# Patient Record
Sex: FEMALE | Race: WHITE | Employment: FULL TIME | URBAN - METROPOLITAN AREA
[De-identification: names, ages, dates, MRNs, and addresses within clinical notes are randomized per-mention and may not be internally consistent; named-entity substitution may affect disease eponyms.]

---

## 2017-05-17 ENCOUNTER — ED HISTORICAL/CONVERTED ENCOUNTER (OUTPATIENT)
Dept: OTHER | Age: 42
End: 2017-05-17

## 2017-09-18 ENCOUNTER — ED HISTORICAL/CONVERTED ENCOUNTER (OUTPATIENT)
Dept: OTHER | Age: 42
End: 2017-09-18

## 2018-02-28 ENCOUNTER — ED HISTORICAL/CONVERTED ENCOUNTER (OUTPATIENT)
Dept: OTHER | Age: 43
End: 2018-02-28

## 2018-03-04 ENCOUNTER — ED HISTORICAL/CONVERTED ENCOUNTER (OUTPATIENT)
Dept: OTHER | Age: 43
End: 2018-03-04

## 2018-05-11 ENCOUNTER — ED HISTORICAL/CONVERTED ENCOUNTER (OUTPATIENT)
Dept: OTHER | Age: 43
End: 2018-05-11

## 2018-07-15 ENCOUNTER — ED HISTORICAL/CONVERTED ENCOUNTER (OUTPATIENT)
Dept: OTHER | Age: 43
End: 2018-07-15

## 2018-07-21 ENCOUNTER — ED HISTORICAL/CONVERTED ENCOUNTER (OUTPATIENT)
Dept: OTHER | Age: 43
End: 2018-07-21

## 2019-01-19 ENCOUNTER — ED HISTORICAL/CONVERTED ENCOUNTER (OUTPATIENT)
Dept: OTHER | Age: 44
End: 2019-01-19

## 2019-04-15 ENCOUNTER — ED HISTORICAL/CONVERTED ENCOUNTER (OUTPATIENT)
Dept: OTHER | Age: 44
End: 2019-04-15

## 2019-05-08 LAB — PAP SMEAR, EXTERNAL: NEGATIVE

## 2019-05-10 ENCOUNTER — ED HISTORICAL/CONVERTED ENCOUNTER (OUTPATIENT)
Dept: OTHER | Age: 44
End: 2019-05-10

## 2019-06-23 ENCOUNTER — ED HISTORICAL/CONVERTED ENCOUNTER (OUTPATIENT)
Dept: OTHER | Age: 44
End: 2019-06-23

## 2019-07-07 ENCOUNTER — ED HISTORICAL/CONVERTED ENCOUNTER (OUTPATIENT)
Dept: OTHER | Age: 44
End: 2019-07-07

## 2019-08-17 ENCOUNTER — ED HISTORICAL/CONVERTED ENCOUNTER (OUTPATIENT)
Dept: OTHER | Age: 44
End: 2019-08-17

## 2019-08-19 ENCOUNTER — ED HISTORICAL/CONVERTED ENCOUNTER (OUTPATIENT)
Dept: OTHER | Age: 44
End: 2019-08-19

## 2019-10-29 ENCOUNTER — ED HISTORICAL/CONVERTED ENCOUNTER (OUTPATIENT)
Dept: OTHER | Age: 44
End: 2019-10-29

## 2019-12-03 ENCOUNTER — ED HISTORICAL/CONVERTED ENCOUNTER (OUTPATIENT)
Dept: OTHER | Age: 44
End: 2019-12-03

## 2019-12-14 ENCOUNTER — ED HISTORICAL/CONVERTED ENCOUNTER (OUTPATIENT)
Dept: OTHER | Age: 44
End: 2019-12-14

## 2020-02-19 ENCOUNTER — ED HISTORICAL/CONVERTED ENCOUNTER (OUTPATIENT)
Dept: OTHER | Age: 45
End: 2020-02-19

## 2020-05-01 ENCOUNTER — OP HISTORICAL/CONVERTED ENCOUNTER (OUTPATIENT)
Dept: OTHER | Age: 45
End: 2020-05-01

## 2020-05-08 ENCOUNTER — OP HISTORICAL/CONVERTED ENCOUNTER (OUTPATIENT)
Dept: OTHER | Age: 45
End: 2020-05-08

## 2020-05-26 LAB
CREATININE, EXTERNAL: 0.79
LDL-C, EXTERNAL: 178

## 2020-07-10 ENCOUNTER — ED HISTORICAL/CONVERTED ENCOUNTER (OUTPATIENT)
Dept: OTHER | Age: 45
End: 2020-07-10

## 2020-07-25 ENCOUNTER — ED HISTORICAL/CONVERTED ENCOUNTER (OUTPATIENT)
Dept: OTHER | Age: 45
End: 2020-07-25

## 2020-08-11 ENCOUNTER — ED HISTORICAL/CONVERTED ENCOUNTER (OUTPATIENT)
Dept: OTHER | Age: 45
End: 2020-08-11

## 2020-08-22 VITALS
SYSTOLIC BLOOD PRESSURE: 180 MMHG | HEART RATE: 78 BPM | WEIGHT: 199.5 LBS | BODY MASS INDEX: 30.23 KG/M2 | HEIGHT: 68 IN | DIASTOLIC BLOOD PRESSURE: 108 MMHG | OXYGEN SATURATION: 98 % | TEMPERATURE: 97.8 F

## 2020-08-22 PROBLEM — E78.00 PURE HYPERCHOLESTEROLEMIA: Status: ACTIVE | Noted: 2020-05-29

## 2020-08-22 PROBLEM — R73.01 IMPAIRED FASTING GLUCOSE: Status: ACTIVE | Noted: 2020-05-29

## 2020-08-22 PROBLEM — I10 HYPERTENSION, ESSENTIAL: Status: ACTIVE | Noted: 2020-05-18

## 2020-08-22 PROBLEM — E66.9 OBESITY, UNSPECIFIED: Status: ACTIVE | Noted: 2020-05-18

## 2020-08-22 PROBLEM — F17.290 NICOTINE DEPENDENCE, OTHER TOBACCO PRODUCT, UNCOMPLICATED: Status: ACTIVE | Noted: 2020-05-18

## 2020-08-22 RX ORDER — HYDROCHLOROTHIAZIDE 12.5 MG/1
TABLET ORAL
COMMUNITY
Start: 2020-06-15 | End: 2021-08-31

## 2020-08-22 RX ORDER — ATORVASTATIN CALCIUM 10 MG/1
TABLET, FILM COATED ORAL
COMMUNITY
Start: 2020-05-29 | End: 2021-08-31

## 2020-08-22 RX ORDER — IBUPROFEN 800 MG/1
TABLET ORAL
COMMUNITY
End: 2021-08-31

## 2020-08-22 RX ORDER — ALBUTEROL SULFATE 90 UG/1
AEROSOL, METERED RESPIRATORY (INHALATION)
COMMUNITY
End: 2021-08-31

## 2020-08-22 RX ORDER — ACETAMINOPHEN 500 MG
TABLET ORAL
COMMUNITY
End: 2021-08-31

## 2020-08-22 RX ORDER — AMLODIPINE BESYLATE 5 MG/1
5 TABLET ORAL 2 TIMES DAILY
COMMUNITY
End: 2021-08-31

## 2020-08-28 DIAGNOSIS — R73.01 IMPAIRED FASTING GLUCOSE: ICD-10-CM

## 2020-08-28 DIAGNOSIS — E78.00 PURE HYPERCHOLESTEROLEMIA: Primary | ICD-10-CM

## 2020-10-20 ENCOUNTER — OFFICE VISIT (OUTPATIENT)
Dept: OBGYN CLINIC | Age: 45
End: 2020-10-20
Payer: MEDICAID

## 2020-10-20 VITALS
HEIGHT: 68 IN | DIASTOLIC BLOOD PRESSURE: 86 MMHG | WEIGHT: 205 LBS | SYSTOLIC BLOOD PRESSURE: 130 MMHG | BODY MASS INDEX: 31.07 KG/M2

## 2020-10-20 DIAGNOSIS — N94.6 DYSMENORRHEA: ICD-10-CM

## 2020-10-20 DIAGNOSIS — N92.6 IRREGULAR MENSES: Primary | ICD-10-CM

## 2020-10-20 DIAGNOSIS — N76.0 VAGINITIS AND VULVOVAGINITIS: ICD-10-CM

## 2020-10-20 PROCEDURE — 99214 OFFICE O/P EST MOD 30 MIN: CPT | Performed by: OBSTETRICS & GYNECOLOGY

## 2020-10-20 RX ORDER — NAPROXEN 500 MG/1
500 TABLET ORAL 2 TIMES DAILY WITH MEALS
Qty: 60 TAB | Refills: 1 | Status: SHIPPED | OUTPATIENT
Start: 2020-10-20 | End: 2021-08-31

## 2020-10-20 NOTE — PATIENT INSTRUCTIONS
Pelvic Pain: Care Instructions Your Care Instructions Pelvic pain, or pain in the lower belly, can have many causes. Often pelvic pain is not serious and gets better in a few days. If your pain continues or gets worse, you may need tests and treatment. Tell your doctor about any new symptoms. These may be signs of a serious problem. Follow-up care is a key part of your treatment and safety. Be sure to make and go to all appointments, and call your doctor if you are having problems. It's also a good idea to know your test results and keep a list of the medicines you take. How can you care for yourself at home? · Rest until you feel better. Lie down, and raise your legs by placing a pillow under your knees. · Drink plenty of fluids. You may find that small, frequent sips are easier on your stomach than if you drink a lot at once. Avoid drinks with carbonation or caffeine, such as soda pop, tea, or coffee. · Try eating several small meals instead of 2 or 3 large ones. Eat mild foods, such as rice, dry toast or crackers, bananas, and applesauce. Avoid fatty and spicy foods, other fruits, and alcohol until 48 hours after your symptoms have gone away. · Take an over-the-counter pain medicine, such as acetaminophen (Tylenol), ibuprofen (Advil, Motrin), or naproxen (Aleve). Read and follow all instructions on the label. · Do not take two or more pain medicines at the same time unless the doctor told you to. Many pain medicines have acetaminophen, which is Tylenol. Too much acetaminophen (Tylenol) can be harmful. · You can put a heating pad, a warm cloth, or moist heat on your belly to relieve pain. When should you call for help? Call your doctor now or seek immediate medical care if: 
  · You have a new or higher fever.  
  · You have unusual vaginal bleeding.  
  · You have new or worse belly or pelvic pain.  
  · You have vaginal discharge that has increased in amount or smells bad. Watch closely for changes in your health, and be sure to contact your doctor if: 
  · You do not get better as expected. Where can you learn more? Go to http://www.gray.com/ Enter [phone] in the search box to learn more about \"Pelvic Pain: Care Instructions. \" Current as of: November 8, 2019               Content Version: 12.6 © 2588-8849 [employer]. Care instructions adapted under license by [employer] (which disclaims liability or warranty for this information). If you have questions about a medical condition or this instruction, always ask your healthcare professional. [address] any warranty or liability for your use of this information.

## 2020-10-20 NOTE — LETTER
NOTIFICATION RETURN TO WORK / SCHOOL 
 
10/20/2020 10:58 AM 
 
Ms. Key Mccann [place] To Whom It May Concern: 
 
Key Mccann is currently under the care of [address] [place] [address]. She will return to work/school on: 10/21/2020 If there are questions or concerns please have the patient contact our office.  
 
 
 
Sincerely, 
 
 
Arnulfo Kurtz MD

## 2020-10-20 NOTE — PROGRESS NOTES
Adarsh Vásquez is a 41 y.o. female, , Patient's last menstrual period was 10/19/2020., who presents today for the following:  Chief Complaint   Patient presents with    Gyn Exam     Pt c/o pelvic pain        Allergies   Allergen Reactions    Sulfa (Sulfonamide Antibiotics) Swelling     Eyes    Amlodipine Swelling       Current Outpatient Medications   Medication Sig    naproxen (NAPROSYN) 500 mg tablet Take 1 Tab by mouth two (2) times daily (with meals).  atorvastatin (LIPITOR) 10 mg tablet TK 1 T PO QHS    hydroCHLOROthiazide (HYDRODIURIL) 12.5 mg tablet TK 1 T PO  QD    amLODIPine (NORVASC) 5 mg tablet Take 5 mg by mouth daily.  acetaminophen (TYLENOL) 500 mg tablet Take  by mouth every six (6) hours as needed for Pain.  ibuprofen (MOTRIN) 800 mg tablet Take  by mouth.  norethindrone-ethinyl estradiol (ORTHO-NOVUM 1-35 TAB, NORTREL 1-35 TAB) 1-35 mg-mcg tab Take  by mouth.  albuterol (ProAir HFA) 90 mcg/actuation inhaler Take  by inhalation. No current facility-administered medications for this visit.         Past Medical History:   Diagnosis Date    Hypertension, essential 2020    Impaired fasting glucose 2020    Nicotine dependence, other tobacco product, uncomplicated     Obesity, unspecified 2020    Pure hypercholesterolemia 2020       Past Surgical History:   Procedure Laterality Date    HX  SECTION      HX  SECTION      HX TONSILLECTOMY      HX WISDOM TEETH EXTRACTION         Family History   Problem Relation Age of Onset    Stroke Mother        Social History     Socioeconomic History    Marital status: LEGALLY      Spouse name: Not on file    Number of children: Not on file    Years of education: Not on file    Highest education level: Not on file   Occupational History    Not on file   Social Needs    Financial resource strain: Not on file    Food insecurity     Worry: Not on file     Inability: Not on file    Transportation needs     Medical: Not on file     Non-medical: Not on file   Tobacco Use    Smoking status: Current Every Day Smoker     Packs/day: 0.25    Smokeless tobacco: Never Used   Substance and Sexual Activity    Alcohol use: Not Currently    Drug use: Never    Sexual activity: Not on file   Lifestyle    Physical activity     Days per week: Not on file     Minutes per session: Not on file    Stress: Not on file   Relationships    Social connections     Talks on phone: Not on file     Gets together: Not on file     Attends Islam service: Not on file     Active member of club or organization: Not on file     Attends meetings of clubs or organizations: Not on file     Relationship status: Not on file    Intimate partner violence     Fear of current or ex partner: Not on file     Emotionally abused: Not on file     Physically abused: Not on file     Forced sexual activity: Not on file   Other Topics Concern    Not on file   Social History Narrative    Not on file         HPI   HPI  Reported by patient. Location: pelvis  Onset/Timing: > 6 months  Duration: > 6 months  Quality: aching; cramping; pressure; fullness/bloating   Severity: interferes with normal activities   Alleviating Factors:  rest  Aggravating Factors: movement  Associated Symptoms: no back pain; no chills; no constipation; no diarrhea; no vaginal discharge; no pain with urination; normal emptying of bladder; no feelings of urgency; no blood in the urine; normal libido; no fever; no nausea; no vomiting; no nocturia; heavy cycles with clots. Was scheduled for Hysteroscopy with D&C with polypectomy- awaiting cardiac clearance    Review of Systems   Constitutional: Negative. Respiratory: Negative. Cardiovascular: Negative. Gastrointestinal: Negative. Genitourinary: Negative. Musculoskeletal: Negative. Skin: Negative. Neurological: Negative. Endo/Heme/Allergies: Negative. Psychiatric/Behavioral: Negative. All other systems reviewed and are negative. /86 (BP 1 Location: Right arm)   Ht 5' 8\" (1.727 m)   Wt 205 lb (93 kg)   LMP 10/19/2020   BMI 31.17 kg/m²    OBGyn Exam   PE:  Constitutional: General Appearance: healthy-appearing, well-nourished, well-developed, and well groomed. Psychiatric: Orientation: to time, place, and person. Mood and Affect: normal mood and affect and appropriate and active and alert. Abdomen: Auscultation/Inspection/Palpation: tenderness and mass and non-distended. Hernia: none palpated. Female Genitalia: Vulva: no masses, atrophy, or lesions. Bladder/Urethra: no urethral discharge or mass and normal meatus and bladder non distended. Vagina no tenderness, erythema, cystocele, rectocele, abnormal vaginal discharge,    Cervix: no discharge or cervical motion tenderness and grossly normal.     Uterus: mobile, non-tender, and no uterine prolapse. Adnexa/Parametria: no adnexal tenderness. 1. Irregular menses      2. Dysmenorrhea    - naproxen (NAPROSYN) 500 mg tablet; Take 1 Tab by mouth two (2) times daily (with meals). Dispense: 60 Tab; Refill: 1    3.  Vaginitis and vulvovaginitis    - NUSWAB VAGINITIS PLUS (VG+) WITH CANDIDA (SIX SPECIES)

## 2020-10-22 LAB
A VAGINAE DNA VAG QL NAA+PROBE: ABNORMAL SCORE
BVAB2 DNA VAG QL NAA+PROBE: ABNORMAL SCORE
C ALBICANS DNA VAG QL NAA+PROBE: NEGATIVE
C GLABRATA DNA VAG QL NAA+PROBE: NEGATIVE
C KRUSEI DNA VAG QL NAA+PROBE: NEGATIVE
C LUSITANIAE DNA VAG QL NAA+PROBE: NEGATIVE
C TRACH DNA VAG QL NAA+PROBE: NEGATIVE
CANDIDA DNA VAG QL NAA+PROBE: NEGATIVE
MEGA1 DNA VAG QL NAA+PROBE: ABNORMAL SCORE
N GONORRHOEA DNA VAG QL NAA+PROBE: NEGATIVE
T VAGINALIS DNA VAG QL NAA+PROBE: NEGATIVE

## 2020-11-03 ENCOUNTER — HOSPITAL ENCOUNTER (OUTPATIENT)
Dept: PREADMISSION TESTING | Age: 45
Discharge: HOME | End: 2020-11-03
Payer: MEDICAID

## 2020-11-03 VITALS
RESPIRATION RATE: 16 BRPM | OXYGEN SATURATION: 99 % | TEMPERATURE: 98.4 F | HEIGHT: 68 IN | BODY MASS INDEX: 31.67 KG/M2 | SYSTOLIC BLOOD PRESSURE: 153 MMHG | DIASTOLIC BLOOD PRESSURE: 93 MMHG | WEIGHT: 209 LBS

## 2020-11-03 LAB
ALBUMIN SERPL-MCNC: 3.7 G/DL (ref 3.5–5)
ALBUMIN/GLOB SERPL: 1 {RATIO} (ref 1.1–2.2)
ALP SERPL-CCNC: 74 U/L (ref 45–117)
ALT SERPL-CCNC: 22 U/L (ref 12–78)
ANION GAP SERPL CALC-SCNC: 5 MMOL/L (ref 5–15)
AST SERPL W P-5'-P-CCNC: 16 U/L (ref 15–37)
BILIRUB SERPL-MCNC: 0.2 MG/DL (ref 0.2–1)
BUN SERPL-MCNC: 13 MG/DL (ref 6–20)
BUN/CREAT SERPL: 15 (ref 12–20)
CA-I BLD-MCNC: 9.1 MG/DL (ref 8.5–10.1)
CHLORIDE SERPL-SCNC: 107 MMOL/L (ref 97–108)
CO2 SERPL-SCNC: 28 MMOL/L (ref 21–32)
CREAT SERPL-MCNC: 0.85 MG/DL (ref 0.55–1.02)
ERYTHROCYTE [DISTWIDTH] IN BLOOD BY AUTOMATED COUNT: 13.6 % (ref 11.5–14.5)
GLOBULIN SER CALC-MCNC: 3.6 G/DL (ref 2–4)
GLUCOSE SERPL-MCNC: 98 MG/DL (ref 65–100)
HCT VFR BLD AUTO: 41.8 % (ref 35–47)
HGB BLD-MCNC: 14 G/DL (ref 11.5–16)
MCH RBC QN AUTO: 29 PG (ref 26–34)
MCHC RBC AUTO-ENTMCNC: 33.5 G/DL (ref 30–36.5)
MCV RBC AUTO: 86.7 FL (ref 80–99)
PLATELET # BLD AUTO: 297 K/UL (ref 150–400)
PMV BLD AUTO: 10.8 FL (ref 8.9–12.9)
POTASSIUM SERPL-SCNC: 3.7 MMOL/L (ref 3.5–5.1)
PROT SERPL-MCNC: 7.3 G/DL (ref 6.4–8.2)
RBC # BLD AUTO: 4.82 M/UL (ref 3.8–5.2)
SODIUM SERPL-SCNC: 140 MMOL/L (ref 136–145)
WBC # BLD AUTO: 8.5 K/UL (ref 3.6–11)

## 2020-11-03 PROCEDURE — 86900 BLOOD TYPING SEROLOGIC ABO: CPT

## 2020-11-03 PROCEDURE — 36415 COLL VENOUS BLD VENIPUNCTURE: CPT

## 2020-11-03 PROCEDURE — 80053 COMPREHEN METABOLIC PANEL: CPT

## 2020-11-03 PROCEDURE — 85027 COMPLETE CBC AUTOMATED: CPT

## 2020-11-03 NOTE — PERIOP NOTES
Patient stated during PAT visit that she went to cardiologist and received cardiac clearance.  [address] [facility], spoke to Pieter Ercikson, requested clearance and office notes to be faxed to PAT dept

## 2020-11-04 LAB
ABO + RH BLD: NORMAL
BLOOD BANK CMNT PATIENT-IMP: NORMAL
BLOOD GROUP ANTIBODIES SERPL: NEGATIVE
SPECIMEN EXP DATE BLD: NORMAL

## 2020-11-07 ENCOUNTER — HOSPITAL ENCOUNTER (OUTPATIENT)
Dept: PREADMISSION TESTING | Age: 45
Discharge: HOME | End: 2020-11-07
Payer: MEDICAID

## 2020-11-07 LAB — SARS-COV-2, COV2: NORMAL

## 2020-11-07 PROCEDURE — 87635 SARS-COV-2 COVID-19 AMP PRB: CPT

## 2020-11-10 LAB — SARS-COV-2, COV2NT: NOT DETECTED

## 2020-11-11 ENCOUNTER — HOSPITAL ENCOUNTER (OUTPATIENT)
Age: 45
Discharge: HOME | End: 2020-11-11
Attending: OBSTETRICS & GYNECOLOGY | Admitting: OBSTETRICS & GYNECOLOGY
Payer: MEDICAID

## 2020-11-11 ENCOUNTER — ANESTHESIA EVENT (OUTPATIENT)
Dept: SURGERY | Age: 45
End: 2020-11-11
Payer: MEDICAID

## 2020-11-11 ENCOUNTER — ANESTHESIA (OUTPATIENT)
Dept: SURGERY | Age: 45
End: 2020-11-11
Payer: MEDICAID

## 2020-11-11 VITALS
HEART RATE: 70 BPM | SYSTOLIC BLOOD PRESSURE: 140 MMHG | DIASTOLIC BLOOD PRESSURE: 78 MMHG | WEIGHT: 207 LBS | BODY MASS INDEX: 31.37 KG/M2 | OXYGEN SATURATION: 98 % | RESPIRATION RATE: 18 BRPM | TEMPERATURE: 97.3 F | HEIGHT: 68 IN

## 2020-11-11 DIAGNOSIS — G89.18 POST-OP PAIN: Primary | ICD-10-CM

## 2020-11-11 PROBLEM — N92.6 IRREGULAR MENSES: Status: ACTIVE | Noted: 2020-11-11

## 2020-11-11 LAB
HCG SERPL QL: NEGATIVE
POTASSIUM SERPL-SCNC: 3.2 MMOL/L (ref 3.5–5.1)

## 2020-11-11 PROCEDURE — 76210000006 HC OR PH I REC 0.5 TO 1 HR: Performed by: OBSTETRICS & GYNECOLOGY

## 2020-11-11 PROCEDURE — 74011250636 HC RX REV CODE- 250/636: Performed by: NURSE ANESTHETIST, CERTIFIED REGISTERED

## 2020-11-11 PROCEDURE — 76210000026 HC REC RM PH II 1 TO 1.5 HR: Performed by: OBSTETRICS & GYNECOLOGY

## 2020-11-11 PROCEDURE — 2709999900 HC NON-CHARGEABLE SUPPLY: Performed by: OBSTETRICS & GYNECOLOGY

## 2020-11-11 PROCEDURE — 36415 COLL VENOUS BLD VENIPUNCTURE: CPT

## 2020-11-11 PROCEDURE — 84703 CHORIONIC GONADOTROPIN ASSAY: CPT

## 2020-11-11 PROCEDURE — 74011250637 HC RX REV CODE- 250/637: Performed by: OBSTETRICS & GYNECOLOGY

## 2020-11-11 PROCEDURE — 76060000032 HC ANESTHESIA 0.5 TO 1 HR: Performed by: OBSTETRICS & GYNECOLOGY

## 2020-11-11 PROCEDURE — 74011250636 HC RX REV CODE- 250/636: Performed by: OBSTETRICS & GYNECOLOGY

## 2020-11-11 PROCEDURE — 58558 HYSTEROSCOPY BIOPSY: CPT | Performed by: OBSTETRICS & GYNECOLOGY

## 2020-11-11 PROCEDURE — 77030033136 HC TBNG INFLO AQUILEX ST HOLO -C: Performed by: OBSTETRICS & GYNECOLOGY

## 2020-11-11 PROCEDURE — 77030033137 HC TBNG OUTFLO AQUILEX ST HOLO -B: Performed by: OBSTETRICS & GYNECOLOGY

## 2020-11-11 PROCEDURE — 84132 ASSAY OF SERUM POTASSIUM: CPT

## 2020-11-11 PROCEDURE — 77030037417 HC DEV TISS RMVL HOLO -H: Performed by: OBSTETRICS & GYNECOLOGY

## 2020-11-11 PROCEDURE — 74011250636 HC RX REV CODE- 250/636: Performed by: ANESTHESIOLOGY

## 2020-11-11 PROCEDURE — 74011000250 HC RX REV CODE- 250: Performed by: NURSE ANESTHETIST, CERTIFIED REGISTERED

## 2020-11-11 PROCEDURE — 76010000138 HC OR TIME 0.5 TO 1 HR: Performed by: OBSTETRICS & GYNECOLOGY

## 2020-11-11 PROCEDURE — 88305 TISSUE EXAM BY PATHOLOGIST: CPT

## 2020-11-11 RX ORDER — METOPROLOL TARTRATE 5 MG/5ML
2.5 INJECTION INTRAVENOUS
Status: DISCONTINUED | OUTPATIENT
Start: 2020-11-11 | End: 2020-11-12 | Stop reason: HOSPADM

## 2020-11-11 RX ORDER — HYDROMORPHONE HYDROCHLORIDE 1 MG/ML
0.5 INJECTION, SOLUTION INTRAMUSCULAR; INTRAVENOUS; SUBCUTANEOUS
Status: DISCONTINUED | OUTPATIENT
Start: 2020-11-11 | End: 2020-11-12 | Stop reason: HOSPADM

## 2020-11-11 RX ORDER — DEXAMETHASONE SODIUM PHOSPHATE 4 MG/ML
INJECTION, SOLUTION INTRA-ARTICULAR; INTRALESIONAL; INTRAMUSCULAR; INTRAVENOUS; SOFT TISSUE AS NEEDED
Status: DISCONTINUED | OUTPATIENT
Start: 2020-11-11 | End: 2020-11-11 | Stop reason: HOSPADM

## 2020-11-11 RX ORDER — SODIUM CHLORIDE, SODIUM LACTATE, POTASSIUM CHLORIDE, CALCIUM CHLORIDE 600; 310; 30; 20 MG/100ML; MG/100ML; MG/100ML; MG/100ML
20 INJECTION, SOLUTION INTRAVENOUS CONTINUOUS
Status: DISCONTINUED | OUTPATIENT
Start: 2020-11-11 | End: 2020-11-11 | Stop reason: HOSPADM

## 2020-11-11 RX ORDER — FENTANYL CITRATE 50 UG/ML
INJECTION, SOLUTION INTRAMUSCULAR; INTRAVENOUS AS NEEDED
Status: DISCONTINUED | OUTPATIENT
Start: 2020-11-11 | End: 2020-11-11 | Stop reason: HOSPADM

## 2020-11-11 RX ORDER — OXYCODONE AND ACETAMINOPHEN 5; 325 MG/1; MG/1
1 TABLET ORAL ONCE
Status: COMPLETED | OUTPATIENT
Start: 2020-11-11 | End: 2020-11-11

## 2020-11-11 RX ORDER — FENTANYL CITRATE 50 UG/ML
50 INJECTION, SOLUTION INTRAMUSCULAR; INTRAVENOUS
Status: DISCONTINUED | OUTPATIENT
Start: 2020-11-11 | End: 2020-11-12 | Stop reason: HOSPADM

## 2020-11-11 RX ORDER — MIDAZOLAM HYDROCHLORIDE 1 MG/ML
INJECTION, SOLUTION INTRAMUSCULAR; INTRAVENOUS AS NEEDED
Status: DISCONTINUED | OUTPATIENT
Start: 2020-11-11 | End: 2020-11-11 | Stop reason: HOSPADM

## 2020-11-11 RX ORDER — LIDOCAINE HYDROCHLORIDE 20 MG/ML
INJECTION, SOLUTION EPIDURAL; INFILTRATION; INTRACAUDAL; PERINEURAL AS NEEDED
Status: DISCONTINUED | OUTPATIENT
Start: 2020-11-11 | End: 2020-11-11 | Stop reason: HOSPADM

## 2020-11-11 RX ORDER — SODIUM CHLORIDE 0.9 % (FLUSH) 0.9 %
5-40 SYRINGE (ML) INJECTION EVERY 8 HOURS
Status: DISCONTINUED | OUTPATIENT
Start: 2020-11-11 | End: 2020-11-12 | Stop reason: HOSPADM

## 2020-11-11 RX ORDER — SODIUM CHLORIDE 0.9 % (FLUSH) 0.9 %
5-40 SYRINGE (ML) INJECTION EVERY 8 HOURS
Status: DISCONTINUED | OUTPATIENT
Start: 2020-11-11 | End: 2020-11-11 | Stop reason: HOSPADM

## 2020-11-11 RX ORDER — PROPOFOL 10 MG/ML
INJECTION, EMULSION INTRAVENOUS AS NEEDED
Status: DISCONTINUED | OUTPATIENT
Start: 2020-11-11 | End: 2020-11-11 | Stop reason: HOSPADM

## 2020-11-11 RX ORDER — ONDANSETRON 2 MG/ML
4 INJECTION INTRAMUSCULAR; INTRAVENOUS AS NEEDED
Status: DISCONTINUED | OUTPATIENT
Start: 2020-11-11 | End: 2020-11-12 | Stop reason: HOSPADM

## 2020-11-11 RX ORDER — ONDANSETRON 2 MG/ML
INJECTION INTRAMUSCULAR; INTRAVENOUS AS NEEDED
Status: DISCONTINUED | OUTPATIENT
Start: 2020-11-11 | End: 2020-11-11 | Stop reason: HOSPADM

## 2020-11-11 RX ORDER — IBUPROFEN 800 MG/1
800 TABLET ORAL
Qty: 30 TAB | Refills: 0 | Status: SHIPPED | OUTPATIENT
Start: 2020-11-11 | End: 2021-08-31

## 2020-11-11 RX ORDER — OXYCODONE AND ACETAMINOPHEN 5; 325 MG/1; MG/1
1 TABLET ORAL
Qty: 10 TAB | Refills: 0 | Status: SHIPPED | OUTPATIENT
Start: 2020-11-11 | End: 2020-11-14

## 2020-11-11 RX ORDER — KETOROLAC TROMETHAMINE 30 MG/ML
INJECTION, SOLUTION INTRAMUSCULAR; INTRAVENOUS AS NEEDED
Status: DISCONTINUED | OUTPATIENT
Start: 2020-11-11 | End: 2020-11-11 | Stop reason: HOSPADM

## 2020-11-11 RX ORDER — LABETALOL HCL 20 MG/4 ML
10 SYRINGE (ML) INTRAVENOUS
Status: DISCONTINUED | OUTPATIENT
Start: 2020-11-11 | End: 2020-11-12 | Stop reason: HOSPADM

## 2020-11-11 RX ORDER — SODIUM CHLORIDE 0.9 % (FLUSH) 0.9 %
5-40 SYRINGE (ML) INJECTION AS NEEDED
Status: DISCONTINUED | OUTPATIENT
Start: 2020-11-11 | End: 2020-11-12 | Stop reason: HOSPADM

## 2020-11-11 RX ORDER — SODIUM CHLORIDE 9 MG/ML
1000 INJECTION, SOLUTION INTRAVENOUS CONTINUOUS
Status: DISCONTINUED | OUTPATIENT
Start: 2020-11-11 | End: 2020-11-12 | Stop reason: HOSPADM

## 2020-11-11 RX ADMIN — PROPOFOL 150 MG: 10 INJECTION, EMULSION INTRAVENOUS at 09:46

## 2020-11-11 RX ADMIN — FENTANYL CITRATE 50 MCG: 50 INJECTION, SOLUTION INTRAMUSCULAR; INTRAVENOUS at 11:12

## 2020-11-11 RX ADMIN — OXYCODONE HYDROCHLORIDE AND ACETAMINOPHEN 1 TABLET: 5; 325 TABLET ORAL at 12:18

## 2020-11-11 RX ADMIN — DEXAMETHASONE SODIUM PHOSPHATE 4 MG: 4 INJECTION, SOLUTION INTRA-ARTICULAR; INTRALESIONAL; INTRAMUSCULAR; INTRAVENOUS; SOFT TISSUE at 09:57

## 2020-11-11 RX ADMIN — HYDROMORPHONE HYDROCHLORIDE 0.5 MG: 1 INJECTION, SOLUTION INTRAMUSCULAR; INTRAVENOUS; SUBCUTANEOUS at 11:02

## 2020-11-11 RX ADMIN — FENTANYL CITRATE 25 MCG: 50 INJECTION, SOLUTION INTRAMUSCULAR; INTRAVENOUS at 09:51

## 2020-11-11 RX ADMIN — LIDOCAINE HYDROCHLORIDE 100 MG: 20 INJECTION, SOLUTION EPIDURAL; INFILTRATION; INTRACAUDAL; PERINEURAL at 09:46

## 2020-11-11 RX ADMIN — ONDANSETRON 4 MG: 2 INJECTION INTRAMUSCULAR; INTRAVENOUS at 10:29

## 2020-11-11 RX ADMIN — KETOROLAC TROMETHAMINE 30 MG: 30 INJECTION, SOLUTION INTRAMUSCULAR at 10:30

## 2020-11-11 RX ADMIN — SODIUM CHLORIDE, POTASSIUM CHLORIDE, SODIUM LACTATE AND CALCIUM CHLORIDE 20 ML/HR: 600; 310; 30; 20 INJECTION, SOLUTION INTRAVENOUS at 08:50

## 2020-11-11 RX ADMIN — MIDAZOLAM HYDROCHLORIDE 2 MG: 2 INJECTION, SOLUTION INTRAMUSCULAR; INTRAVENOUS at 09:38

## 2020-11-11 RX ADMIN — HYDROMORPHONE HYDROCHLORIDE 0.5 MG: 1 INJECTION, SOLUTION INTRAMUSCULAR; INTRAVENOUS; SUBCUTANEOUS at 10:48

## 2020-11-11 RX ADMIN — FENTANYL CITRATE 25 MCG: 50 INJECTION, SOLUTION INTRAMUSCULAR; INTRAVENOUS at 09:56

## 2020-11-11 RX ADMIN — FENTANYL CITRATE 50 MCG: 50 INJECTION, SOLUTION INTRAMUSCULAR; INTRAVENOUS at 10:12

## 2020-11-11 NOTE — OP NOTES
Port Operative Report    Date of Surgery: 11/11/2020     Preoperative Diagnosis: Menorrhagia with irregular cycle [N92.1]  Intermenstrual pain [N94.0]    Postoperative Diagnosis: Menorrhagia with irregular cycle [N92.1]  Intermenstrual pain [N94.0]     Procedure:  Procedure(s):  Hysteroscopy Dilation & Curettage w/Myosure     Surgeon: Brigitte Escobar MD    Assistants: Surgeon(s):  Billy Jiménez MD    Anesthesia:  General     Procedure in Detail:  Nichole Jones was taken to the operating room and the patient was taken to the operating room where she was placed in the supine position. After undergoing adequate general endotracheal anesthesia, she was placed up in the  laparoscopy stirrups in the dorsal lithotomy position. The patient was then prepped and draped in the usual fashion. The Bladder was drained of 200cc of urine. Attention was first turned to the vagina where the speculum was placed in the vagina. The anterior lip of the cervix was grasped with a single-toothed tenaculum. The cervix was dilated up to admit a  hysteroscope using normal saline for distention media. The above findings were noted. Myosure introduced and curetting obtained. Scope removed. Using a curet, a gentle curettage was performed and specimen was sent to pathology  Findings:  Thickened appeared endometrium    Estimated Blood Loss:   minimal    Specimens: geovanni

## 2020-11-11 NOTE — DISCHARGE INSTRUCTIONS
Patient Education        Operative Hysteroscopy: What to Expect at Home  Your Recovery     An operative hysteroscopy is a procedure to find and treat problems with your uterus. It may have been done to remove growths from the uterus. Or it may have been done to treat fertility problems or abnormal bleeding. You may have cramps and vaginal bleeding for several days. If the doctor filled your uterus with air during the procedure, you may have gas pains, a feeling of fullness in your belly, or shoulder pain. These symptoms usually go away in 1 or 2 days. If the doctor filled your uterus with liquid during the procedure, you may have watery vaginal discharge for a few days. Many women are able to return to work on the day after the procedure. But it depends on what was done during the procedure and the type of work you do. This care sheet gives you a general idea about how long it will take for you to recover. But each person recovers at a different pace. Follow the steps below to get better as quickly as possible. How can you care for yourself at home? Activity    · Rest when you feel tired. Getting enough sleep will help you recover.     · Most women are able to return to work on the day after the procedure.     · You may shower and take baths as usual.     · Ask your doctor when it is okay for you to have sex.     · Talk about birth control with your doctor. Do not try to become pregnant until your doctor says it is okay. Diet    · You can eat your normal diet. If your stomach is upset, try bland, low-fat foods like plain rice, broiled chicken, toast, and yogurt.     · You may notice that your bowel movements are not regular right after the procedure. This is common. Try to avoid constipation and straining with bowel movements. You may want to take a fiber supplement every day. If you have not had a bowel movement after a couple of days, ask your doctor about taking a mild laxative.    Medicines    · Your doctor will tell you if and when you can restart your medicines. He or she will also give you instructions about taking any new medicines.     · If you take aspirin or some other blood thinner, ask your doctor if and when to start taking it again. Make sure that you understand exactly what your doctor wants you to do.     · Be safe with medicines. Take pain medicines exactly as directed. ? If the doctor gave you a prescription medicine for pain, take it as prescribed. ? If you are not taking a prescription pain medicine, ask your doctor if you can take an over-the-counter medicine. ? Do not take two or more pain medicines at the same time unless the doctor told you to. Many pain medicines have acetaminophen, which is Tylenol. Too much acetaminophen (Tylenol) can be harmful.     · If you think your pain medicine is making you sick to your stomach:  ? Take your medicine after meals (unless your doctor has told you not to). ? Ask your doctor for a different pain medicine.     · If your doctor prescribed antibiotics, take them as directed. Do not stop taking them just because you feel better. You need to take the full course of antibiotics. Other instructions    · You may have some light vaginal bleeding. Wear sanitary pads if needed. Do not douche or use tampons until your doctor says it is okay.     · You may want to use a heating pad on your belly to help with pain. Use a low heat setting. Follow-up care is a key part of your treatment and safety. Be sure to make and go to all appointments, and call your doctor if you are having problems. It's also a good idea to know your test results and keep a list of the medicines you take. When should you call for help? Call 911 anytime you think you may need emergency care. For example, call if:    · You pass out (lose consciousness).     · You have chest pain, are short of breath, or cough up blood.    Call your doctor now or seek immediate medical care if:    · You have bright red vaginal bleeding that soaks one or more pads in an hour, or you have large clots.     · You have vaginal discharge that has increased in amount or smells bad.     · You are sick to your stomach or cannot drink fluids.     · You have pain that does not get better after you take pain medicine.     · You have signs of infection, such as:  ? Increased pain, swelling, warmth, or redness. ? A fever.     · You cannot pass stools or gas.     · You have signs of a blood clot in your leg (called a deep vein thrombosis), such as:  ? Pain in your calf, back of the knee, thigh, or groin. ? Redness and swelling in your leg. Watch closely for changes in your health, and be sure to contact your doctor if you have any problems. Where can you learn more? Go to http://www.gray.com/  Enter R673 in the search box to learn more about \"Operative Hysteroscopy: What to Expect at Home. \"  Current as of: November 8, 2019               Content Version: 12.6  © 7828-3117 [employer]. Care instructions adapted under license by [employer] (which disclaims liability or warranty for this information). If you have questions about a medical condition or this instruction, always ask your healthcare professional. [address] any warranty or liability for your use of this information.

## 2020-11-11 NOTE — ROUTINE PROCESS
Pt. Transferred to [facility] via stretcher in stable condition. Bedside report given. SBAR reviewed.

## 2020-11-11 NOTE — ANESTHESIA POSTPROCEDURE EVALUATION
Procedure(s):  Hysteroscopy Dilation & Curettage w/Myosure.     general    Anesthesia Post Evaluation      Multimodal analgesia: multimodal analgesia used between 6 hours prior to anesthesia start to PACU discharge  Patient location during evaluation: PACU  Patient participation: complete - patient participated  Level of consciousness: awake  Pain score: 0  Pain management: adequate  Airway patency: patent  Anesthetic complications: no  Cardiovascular status: acceptable  Respiratory status: acceptable  Hydration status: acceptable  Post anesthesia nausea and vomiting:  controlled  Final Post Anesthesia Temperature Assessment:  Normothermia (36.0-37.5 degrees C)      INITIAL Post-op Vital signs:   Vitals Value Taken Time   /82 11/11/2020 11:15 AM   Temp 36.3 °C (97.4 °F) 11/11/2020 11:15 AM   Pulse 78 11/11/2020 11:15 AM   Resp 17 11/11/2020 11:15 AM   SpO2 97 % 11/11/2020 11:15 AM

## 2020-11-11 NOTE — ANESTHESIA PREPROCEDURE EVALUATION
Relevant Problems   No relevant active problems       Anesthetic History   No history of anesthetic complications            Review of Systems / Medical History  Patient summary reviewed, nursing notes reviewed and pertinent labs reviewed    Pulmonary  Within defined limits                 Neuro/Psych   Within defined limits           Cardiovascular    Hypertension          Hyperlipidemia         GI/Hepatic/Renal  Within defined limits              Endo/Other        Obesity     Other Findings   Comments: Menorrhagia           Physical Exam    Airway  Mallampati: II  TM Distance: 4 - 6 cm  Neck ROM: normal range of motion   Mouth opening: Normal     Cardiovascular    Rhythm: regular  Rate: normal         Dental    Dentition: Poor dentition     Pulmonary  Breath sounds clear to auscultation               Abdominal  GI exam deferred       Other Findings            Anesthetic Plan    ASA: 2  Anesthesia type: general          Induction: Intravenous  Anesthetic plan and risks discussed with: Patient

## 2020-11-12 ENCOUNTER — NURSE TRIAGE (OUTPATIENT)
Dept: OBGYN CLINIC | Age: 45
End: 2020-11-12

## 2020-11-12 DIAGNOSIS — R11.0 NAUSEA: Primary | ICD-10-CM

## 2020-11-12 RX ORDER — METOCLOPRAMIDE 10 MG/1
10 TABLET ORAL
Qty: 20 TAB | Refills: 0 | Status: SHIPPED | OUTPATIENT
Start: 2020-11-12 | End: 2020-11-17

## 2020-11-12 NOTE — TELEPHONE ENCOUNTER
Patient left a message on triage voicemail that she had surgery yesterday and is experiencing nausea. Returned patient's call and advised her per Dr Abbey Saldaña, a prescription for Reglan has been sent to her pharmacy.

## 2021-01-29 ENCOUNTER — OFFICE VISIT (OUTPATIENT)
Dept: PRIMARY CARE CLINIC | Age: 46
End: 2021-01-29
Payer: MEDICAID

## 2021-01-29 VITALS
SYSTOLIC BLOOD PRESSURE: 150 MMHG | OXYGEN SATURATION: 97 % | DIASTOLIC BLOOD PRESSURE: 90 MMHG | HEART RATE: 77 BPM | TEMPERATURE: 98.4 F

## 2021-01-29 DIAGNOSIS — Z13.6 SCREENING FOR CARDIOVASCULAR, RESPIRATORY, AND GENITOURINARY DISEASES: ICD-10-CM

## 2021-01-29 DIAGNOSIS — R05.9 COUGH: ICD-10-CM

## 2021-01-29 DIAGNOSIS — F17.210 TOBACCO DEPENDENCE DUE TO CIGARETTES: ICD-10-CM

## 2021-01-29 DIAGNOSIS — R43.2 LOSS OF TASTE: ICD-10-CM

## 2021-01-29 DIAGNOSIS — Z13.83 SCREENING FOR CARDIOVASCULAR, RESPIRATORY, AND GENITOURINARY DISEASES: ICD-10-CM

## 2021-01-29 DIAGNOSIS — Z13.89 SCREENING FOR CARDIOVASCULAR, RESPIRATORY, AND GENITOURINARY DISEASES: ICD-10-CM

## 2021-01-29 DIAGNOSIS — Z20.822 SUSPECTED 2019 NOVEL CORONAVIRUS INFECTION: Primary | ICD-10-CM

## 2021-01-29 DIAGNOSIS — R11.0 NAUSEA: ICD-10-CM

## 2021-01-29 PROCEDURE — 99213 OFFICE O/P EST LOW 20 MIN: CPT | Performed by: NURSE PRACTITIONER

## 2021-01-29 NOTE — PROGRESS NOTES
Pat King is a 41 y.o. female who was seen in clinic today (1/29/2021) for an acute visit. Assessment/Plan:            * COVID-19 sample collected and submitted       * Patient given detailed CDC instructions contained within After Visit Summary    Diagnoses and all orders for this visit:    1. Suspected 2019 novel coronavirus infection    2. Screening for cardiovascular, respiratory, and genitourinary diseases  -     NOVEL CORONAVIRUS (COVID-19)    3. Cough  -     NOVEL CORONAVIRUS (COVID-19)    4. Nausea  -     NOVEL CORONAVIRUS (COVID-19)    5. Loss of taste  -     NOVEL CORONAVIRUS (COVID-19)    6. Tobacco dependence due to cigarettes       known covid exposure. Discussed expected course/resolution/complications of diagnosis in detail with patient. Advised pt on CDC guidance, OTC medications for symptom management, red flags reviewed and should develop to seek emergency medical attn. Reviewed with her that COVID-19 pandemic is an evolving situation with rapidly changing recommendations & guidelines, continue to practice hand hygiene, social distancing, wearing of facial coverings. Regardless of testing results, should still follow CDC guidelines as there is a chance of a false negative, such as a poor sample collection or being too soon to test after exposure. Medical decisions are made based on the the best information available at the time. Recommended she stay tuned for updates published by trusted sources and to advise your PCP of any unexpected changes in clinical condition     Subjective:   Shima was seen today for Chills (Patient has c/o symptoms x 3 days, no known COVID exposure), Cough, Generalized Body Aches, Headache, Sore Throat, Nausea, Vomiting, and Diarrhea   Has been taking OTC cold medicine with relief. She denies a recent history/current: loss of smell/taste, cough, fever, wheezing, SOB, and RUELAS. Smokes 3 cigarettes a day down from a pack, isn't interested in any more cessation. Travel Screening     Question   Response    In the last month, have you been in contact with someone who was confirmed or suspected to have Coronavirus / COVID-19? No / Unsure    Have you had a COVID-19 viral test in the last 14 days? Do you have any of the following new or worsening symptoms? Cough; Chills; Sore throat; Loss of taste; Loss of smell;Diarrhea;Vomiting    Have you traveled internationally in the last month? No      Travel History   Travel since 12/29/20     No documented travel since 12/29/20          ROS - Pertinent items are noted in HPI    Patient Active Problem List   Diagnosis Code    Hypertension, essential I10    Pure hypercholesterolemia E78.00    Impaired fasting glucose R73.01    Obesity, unspecified E66.9    Nicotine dependence, other tobacco product, uncomplicated A72.564    Irregular menses N92.6     Home Medications    Medication Sig Start Date End Date Taking? Authorizing Provider   ibuprofen (MOTRIN) 800 mg tablet Take 1 Tab by mouth every eight (8) hours as needed for Pain. 11/11/20   Duane Laughlin MD   naproxen (NAPROSYN) 500 mg tablet Take 1 Tab by mouth two (2) times daily (with meals). Patient taking differently: Take 500 mg by mouth two (2) times daily (with meals). As needed 10/20/20   Duane Laughlin MD   atorvastatin (LIPITOR) 10 mg tablet TK 1 T PO QHS 5/29/20   Provider, Historical   hydroCHLOROthiazide (HYDRODIURIL) 12.5 mg tablet TK 1 T PO  QD 6/15/20   Provider, Historical   amLODIPine (NORVASC) 5 mg tablet Take 5 mg by mouth two (2) times a day. Provider, Historical   acetaminophen (TYLENOL) 500 mg tablet Take  by mouth every six (6) hours as needed for Pain. Provider, Historical   ibuprofen (MOTRIN) 800 mg tablet Take  by mouth. Provider, Historical   norethindrone-ethinyl estradiol (ORTHO-NOVUM 1-35 TAB, NORTREL 1-35 TAB) 1-35 mg-mcg tab Take  by mouth.     Provider, Historical   albuterol (ProAir HFA) 90 mcg/actuation inhaler Take  by inhalation.    Provider, Historical      Allergies   Allergen Reactions   • Sulfa (Sulfonamide Antibiotics) Swelling     Eyes   • Amlodipine Swelling          Objective:   Physical Exam  General:  alert, cooperative, no distress   Ears: Normal external ear canals AU   Sinuses: Normal paranasal sinuses without tenderness   Neck: supple, symmetrical, trachea midline.   Heart: normal rate, regular rhythm   Lungs: No dyspneic or audible respiratory distress.        Visit Vitals  BP (!) 150/90 (BP 1 Location: Right upper arm, BP Patient Position: Sitting)   Pulse 77   Temp 98.4 °F (36.9 °C)   SpO2 97%         Disclaimer:        Medication risks/benefits/costs/interactions/alternatives discussed with patient.  She was given an after visit summary which includes diagnoses, current medications, & vitals.  She expressed understanding with the diagnosis and plan.      Aspects of this note may have been generated using voice recognition software. Despite editing, there may be some syntax errors.       Yuri Gallego NP

## 2021-01-30 LAB — SARS-COV-2, NAA: NOT DETECTED

## 2021-02-01 ENCOUNTER — PATIENT MESSAGE (OUTPATIENT)
Dept: PRIMARY CARE CLINIC | Age: 46
End: 2021-02-01

## 2021-08-31 ENCOUNTER — HOSPITAL ENCOUNTER (EMERGENCY)
Age: 46
Discharge: LEFT BEFORE DISCHARGE | End: 2021-08-31
Attending: EMERGENCY MEDICINE
Payer: MEDICAID

## 2021-08-31 VITALS
BODY MASS INDEX: 31.83 KG/M2 | TEMPERATURE: 98.4 F | WEIGHT: 210 LBS | HEART RATE: 88 BPM | OXYGEN SATURATION: 99 % | DIASTOLIC BLOOD PRESSURE: 105 MMHG | SYSTOLIC BLOOD PRESSURE: 203 MMHG | HEIGHT: 68 IN | RESPIRATION RATE: 18 BRPM

## 2021-08-31 PROCEDURE — 75810000275 HC EMERGENCY DEPT VISIT NO LEVEL OF CARE

## 2021-08-31 NOTE — ED TRIAGE NOTES
\" I have had a sinus infection for the last 2 days, I tried a neti pot and all but its not working so I know its an infection \"

## 2021-12-29 ENCOUNTER — APPOINTMENT (OUTPATIENT)
Dept: GENERAL RADIOLOGY | Age: 46
End: 2021-12-29
Attending: EMERGENCY MEDICINE
Payer: MEDICAID

## 2021-12-29 ENCOUNTER — HOSPITAL ENCOUNTER (EMERGENCY)
Age: 46
Discharge: HOME | End: 2021-12-29
Attending: EMERGENCY MEDICINE
Payer: MEDICAID

## 2021-12-29 VITALS
DIASTOLIC BLOOD PRESSURE: 99 MMHG | WEIGHT: 215 LBS | SYSTOLIC BLOOD PRESSURE: 153 MMHG | OXYGEN SATURATION: 97 % | TEMPERATURE: 98.7 F | HEIGHT: 68 IN | RESPIRATION RATE: 18 BRPM | HEART RATE: 99 BPM | BODY MASS INDEX: 32.58 KG/M2

## 2021-12-29 DIAGNOSIS — J20.9 ACUTE BRONCHITIS, UNSPECIFIED ORGANISM: ICD-10-CM

## 2021-12-29 DIAGNOSIS — J06.9 ACUTE UPPER RESPIRATORY INFECTION: Primary | ICD-10-CM

## 2021-12-29 PROCEDURE — 71045 X-RAY EXAM CHEST 1 VIEW: CPT

## 2021-12-29 PROCEDURE — 99283 EMERGENCY DEPT VISIT LOW MDM: CPT

## 2021-12-29 PROCEDURE — 74011636637 HC RX REV CODE- 636/637: Performed by: EMERGENCY MEDICINE

## 2021-12-29 PROCEDURE — 74011250637 HC RX REV CODE- 250/637: Performed by: EMERGENCY MEDICINE

## 2021-12-29 RX ORDER — AZITHROMYCIN 250 MG/1
500 TABLET, FILM COATED ORAL
Status: COMPLETED | OUTPATIENT
Start: 2021-12-29 | End: 2021-12-29

## 2021-12-29 RX ORDER — PREDNISONE 20 MG/1
60 TABLET ORAL ONCE
Status: COMPLETED | OUTPATIENT
Start: 2021-12-29 | End: 2021-12-29

## 2021-12-29 RX ORDER — PREDNISONE 20 MG/1
TABLET ORAL
Qty: 12 TABLET | Refills: 0 | Status: SHIPPED | OUTPATIENT
Start: 2021-12-29 | End: 2022-07-16

## 2021-12-29 RX ORDER — ALBUTEROL SULFATE 90 UG/1
1 AEROSOL, METERED RESPIRATORY (INHALATION)
Qty: 6.7 G | Refills: 1 | Status: SHIPPED | OUTPATIENT
Start: 2021-12-29

## 2021-12-29 RX ORDER — AZITHROMYCIN 250 MG/1
TABLET, FILM COATED ORAL
Qty: 6 TABLET | Refills: 0 | Status: SHIPPED | OUTPATIENT
Start: 2021-12-29 | End: 2022-07-16

## 2021-12-29 RX ADMIN — PREDNISONE 60 MG: 20 TABLET ORAL at 21:54

## 2021-12-29 RX ADMIN — AZITHROMYCIN MONOHYDRATE 500 MG: 250 TABLET ORAL at 21:54

## 2021-12-29 NOTE — Clinical Note
[address] [facility] EMERGENCY DEPARTMENT  [address] [place] [address]  [phone]    Work/School Note    Date: 12/29/2021     To Whom It May concern:    Swengel Show was evaluated by the following provider(s):  Attending Provider: Viky Sewell MD.   COVID19 virus is suspected. Per the CDC guidelines we recommend home isolation until the following conditions are all met:    1. At least 10 days have passed since symptoms first appeared and  2. At least 24 hours have passed since last fever without the use of fever-reducing medications and  3.  Symptoms (e.g., cough, shortness of breath) have improved      Sincerely,          India Brady MD

## 2021-12-30 NOTE — ED TRIAGE NOTES
Pt states that she has been having cough, fatigue and shortness of breath since Christmas Eve. Pt states her co-worker just tested positive for COVID today.

## 2021-12-30 NOTE — ED PROVIDER NOTES
EMERGENCY DEPARTMENT HISTORY AND PHYSICAL EXAM      Date: 2021  Patient Name: Gianni Moore    History of Presenting Illness     Chief Complaint   Patient presents with    Cough    Nasal Congestion    Shortness of Breath       History Provided By: Patient    HPI: Gianni Moore, 40 y.o. female   presents to the ED with cc of cough. Patient complains of nasal congestion along with a productive cough for last 3 days. Patient also complained intermittent episode of shortness of breath during the coughing episode. Patient states that she has had a pulse with active vomiting yesterday. Patient received 2 Covid vaccination without booster. Patient was recently exposed to COVID-19 at her work. No fever chills. No generalized body ache or headache. No diarrhea. PCP: Mary Pearce MD    No current facility-administered medications on file prior to encounter. No current outpatient medications on file prior to encounter. Past History     Past Medical History:  Past Medical History:   Diagnosis Date    Hypertension, essential 2020    Impaired fasting glucose 2020    Nicotine dependence, other tobacco product, uncomplicated     Obesity, unspecified 2020    Pure hypercholesterolemia 2020    pt states not on medication anymore       Past Surgical History:  Past Surgical History:   Procedure Laterality Date    HX  SECTION      HX  SECTION      HX TONSILLECTOMY      HX WISDOM TEETH EXTRACTION         Family History:  Family History   Problem Relation Age of Onset    Stroke Mother     Cancer Father        Social History:  Social History     Tobacco Use    Smoking status: Current Every Day Smoker     Packs/day: 0.25     Years: 20.00     Pack years: 5.00    Smokeless tobacco: Never Used    Tobacco comment: states 4 a day   Substance Use Topics    Alcohol use: Not Currently    Drug use: Never       Allergies:   Allergies   Allergen Reactions    Sulfa (Sulfonamide Antibiotics) Swelling     Eyes    Amlodipine Swelling         Review of Systems   Review of Systems   Constitutional: Negative for activity change, appetite change, chills and fever. HENT: Positive for rhinorrhea. Negative for sore throat. Eyes: Negative for discharge. Respiratory: Positive for cough. Negative for shortness of breath. Cardiovascular: Negative for chest pain. Gastrointestinal: Negative for nausea. Endocrine: Negative for polyuria. Genitourinary: Negative for difficulty urinating. Musculoskeletal: Negative for arthralgias. Skin: Negative for rash. Neurological: Negative for headaches. Hematological: Negative for adenopathy. Psychiatric/Behavioral: Negative for suicidal ideas. All other systems reviewed and are negative. Physical Exam   Physical Exam  Vitals and nursing note reviewed. Constitutional:       General: She is not in acute distress. Appearance: Normal appearance. She is not ill-appearing, toxic-appearing or diaphoretic. HENT:      Head: Normocephalic and atraumatic. Nose: Nose normal.      Mouth/Throat:      Mouth: Mucous membranes are moist.      Pharynx: Oropharynx is clear. Eyes:      Conjunctiva/sclera: Conjunctivae normal.   Cardiovascular:      Rate and Rhythm: Normal rate and regular rhythm. Heart sounds: Normal heart sounds. Pulmonary:      Effort: Pulmonary effort is normal.      Breath sounds: Rhonchi present. Abdominal:      General: Abdomen is flat. Bowel sounds are normal.      Palpations: Abdomen is soft. Musculoskeletal:      Cervical back: Neck supple. Right lower leg: No edema. Left lower leg: No edema. Skin:     General: Skin is warm and dry. Neurological:      General: No focal deficit present. Mental Status: She is alert and oriented to person, place, and time.    Psychiatric:         Mood and Affect: Mood normal.         Diagnostic Study Results     Labs -   No results found for this or any previous visit (from the past 12 hour(s)). Radiologic Studies -   XR CHEST SNGL V   Final Result   No acute cardiopulmonary process. CT Results  (Last 48 hours)    None        CXR Results  (Last 48 hours)               12/29/21 2006  XR CHEST SNGL V Final result    Impression:  No acute cardiopulmonary process. Narrative:  Exam: XR CHEST SNGL V         Indication:  cough; Comparison: Chest radiograph from July 7, 2019       Findings: The cardiomediastinal silhouette is within normal limits. No focal parenchymal   process. No pleural effusion. No pneumothorax. No acute osseous abnormality. Medical Decision Making   I am the first provider for this patient. I reviewed the vital signs, available nursing notes, past medical history, past surgical history, family history and social history. Vital Signs-Reviewed the patient's vital signs. Patient Vitals for the past 12 hrs:   Temp Pulse Resp BP SpO2   12/29/21 2123  99 18 (!) 153/99    12/29/21 1941 98.7 °F (37.1 °C) (!) 104 16 (!) 177/100 97 %       Records Reviewed:     Provider Notes (Medical Decision Making):       ED Course:   Initial assessment performed. The patients presenting problems have been discussed, and they are in agreement with the care plan formulated and outlined with them. I have encouraged them to ask questions as they arise throughout their visit. PROCEDURES      Disposition: Condition stable   DC- Adult Discharges: All of the diagnostic tests were reviewed and questions answered. Diagnosis, care plan and treatment options were discussed. understand instructions and will follow up as directed. The patients results have been reviewed with them. They have been counseled regarding their diagnosis.   The patient verbally convey understanding and agreement of the signs, symptoms, diagnosis, treatment and prognosis and additionally agrees to follow up as recommended. They also agree with the care-plan and convey that all of their questions have been answered. I have also put together some discharge instructions for them that include: 1) educational information regarding their diagnosis, 2) how to care for their diagnosis at home, as well a 3) list of reasons why they would want to return to the ED prior to their follow-up appointment, should their condition change. PLAN:  1. Current Discharge Medication List      START taking these medications    Details   albuterol (PROVENTIL HFA, VENTOLIN HFA, PROAIR HFA) 90 mcg/actuation inhaler Take 1 Puff by inhalation every four (4) hours as needed for Wheezing. Qty: 6.7 g, Refills: 1  Start date: 12/29/2021      predniSONE (DELTASONE) 20 mg tablet 3 tablets for 2 days then 2 tablets for 2 days then 1 tablet for 2 day  Qty: 12 Tablet, Refills: 0  Start date: 12/29/2021      azithromycin (Zithromax Z-Glenn) 250 mg tablet As instructed in the pack  Qty: 6 Tablet, Refills: 0  Start date: 12/29/2021           2. Follow-up Information     Follow up With Specialties Details Why Contact Info    Follow up with your primary care physician  Schedule an appointment as soon as possible for a visit in 3 days As needed         Return to ED if worse     Diagnosis     Clinical Impression:   1. Acute upper respiratory infection    2. Acute bronchitis, unspecified organism        Please note that this dictation was completed with [employer], the computer voice recognition software. Quite often unanticipated grammatical, syntax, homophones, and other interpretive errors are inadvertently transcribed by the computer software. Please disregard these errors. Please excuse any errors that have escaped final proofreading. Thank you.

## 2022-03-18 PROBLEM — R73.01 IMPAIRED FASTING GLUCOSE: Status: ACTIVE | Noted: 2020-05-29

## 2022-03-18 PROBLEM — E78.00 PURE HYPERCHOLESTEROLEMIA: Status: ACTIVE | Noted: 2020-05-29

## 2022-03-18 PROBLEM — I10 HYPERTENSION, ESSENTIAL: Status: ACTIVE | Noted: 2020-05-18

## 2022-03-19 PROBLEM — F17.290 NICOTINE DEPENDENCE, OTHER TOBACCO PRODUCT, UNCOMPLICATED: Status: ACTIVE | Noted: 2020-05-18

## 2022-03-19 PROBLEM — E66.9 OBESITY, UNSPECIFIED: Status: ACTIVE | Noted: 2020-05-18

## 2022-03-20 PROBLEM — N92.6 IRREGULAR MENSES: Status: ACTIVE | Noted: 2020-11-11

## 2022-07-16 ENCOUNTER — HOSPITAL ENCOUNTER (EMERGENCY)
Age: 47
Discharge: HOME | End: 2022-07-16
Attending: EMERGENCY MEDICINE
Payer: MEDICAID

## 2022-07-16 VITALS
BODY MASS INDEX: 30.01 KG/M2 | SYSTOLIC BLOOD PRESSURE: 170 MMHG | DIASTOLIC BLOOD PRESSURE: 96 MMHG | WEIGHT: 198 LBS | RESPIRATION RATE: 18 BRPM | OXYGEN SATURATION: 96 % | HEART RATE: 86 BPM | HEIGHT: 68 IN | TEMPERATURE: 98.2 F

## 2022-07-16 DIAGNOSIS — S05.01XA ABRASION OF RIGHT CORNEA, INITIAL ENCOUNTER: Primary | ICD-10-CM

## 2022-07-16 PROCEDURE — 74011000250 HC RX REV CODE- 250: Performed by: EMERGENCY MEDICINE

## 2022-07-16 PROCEDURE — 99283 EMERGENCY DEPT VISIT LOW MDM: CPT

## 2022-07-16 RX ORDER — SPIRONOLACTONE 25 MG/1
TABLET ORAL
COMMUNITY
Start: 2022-04-24

## 2022-07-16 RX ORDER — TETRACAINE HYDROCHLORIDE 5 MG/ML
1 SOLUTION OPHTHALMIC
Status: COMPLETED | OUTPATIENT
Start: 2022-07-16 | End: 2022-07-16

## 2022-07-16 RX ORDER — NAPROXEN 500 MG/1
500 TABLET ORAL 2 TIMES DAILY WITH MEALS
Qty: 20 TABLET | Refills: 0 | Status: SHIPPED | OUTPATIENT
Start: 2022-07-16 | End: 2022-07-26

## 2022-07-16 RX ORDER — ERYTHROMYCIN 5 MG/G
OINTMENT OPHTHALMIC
Qty: 3.5 G | Refills: 0 | Status: SHIPPED | OUTPATIENT
Start: 2022-07-16 | End: 2022-07-23

## 2022-07-16 RX ADMIN — TETRACAINE HYDROCHLORIDE 1 DROP: 5 SOLUTION OPHTHALMIC at 10:04

## 2022-07-16 RX ADMIN — FLUORESCEIN SODIUM 1 STRIP: 1 STRIP OPHTHALMIC at 10:04

## 2022-07-16 NOTE — ED PROVIDER NOTES
EMERGENCY DEPARTMENT HISTORY AND PHYSICAL EXAM      Date: 2022  Patient Name: Olga Lidia Phan    History of Presenting Illness     Chief Complaint   Patient presents with    Eye Pain       History Provided By: Patient    HPI: Olga Lidia Phan, 56 y.o. female with a past medical history significant No significant past medical history presents to the ED with cc of right eye pain. Patient was doing yard work this morning when something got into her eye. Since this time she has had eye pain and her eye keeps watering. She is otherwise been in her normal state of good health. There are no other complaints, changes, or physical findings at this time. PCP: Go Soni MD    No current facility-administered medications on file prior to encounter. Current Outpatient Medications on File Prior to Encounter   Medication Sig Dispense Refill    spironolactone (ALDACTONE) 25 mg tablet TAKE 1 TABLET BY MOUTH DAILY FOR BLOOD PRESSURE      albuterol (PROVENTIL HFA, VENTOLIN HFA, PROAIR HFA) 90 mcg/actuation inhaler Take 1 Puff by inhalation every four (4) hours as needed for Wheezing.  6.7 g 1    [DISCONTINUED] predniSONE (DELTASONE) 20 mg tablet 3 tablets for 2 days then 2 tablets for 2 days then 1 tablet for 2 day 12 Tablet 0    [DISCONTINUED] azithromycin (Zithromax Z-Glenn) 250 mg tablet As instructed in the pack 6 Tablet 0       Past History     Past Medical History:  Past Medical History:   Diagnosis Date    Hypertension, essential 2020    Impaired fasting glucose 2020    Nicotine dependence, other tobacco product, uncomplicated     Obesity, unspecified 2020    Pure hypercholesterolemia 2020    pt states not on medication anymore       Past Surgical History:  Past Surgical History:   Procedure Laterality Date    HX  SECTION      HX  SECTION      HX TONSILLECTOMY      HX WISDOM TEETH EXTRACTION         Family History:  Family History   Problem Relation Age of Onset    Stroke Mother     Cancer Father        Social History:  Social History     Tobacco Use    Smoking status: Current Every Day Smoker     Packs/day: 0.25     Years: 20.00     Pack years: 5.00    Smokeless tobacco: Never Used    Tobacco comment: states 4 a day   Substance Use Topics    Alcohol use: Not Currently    Drug use: Never       Allergies: Allergies   Allergen Reactions    Sulfa (Sulfonamide Antibiotics) Swelling     Eyes    Amlodipine Swelling         Review of Systems     Review of Systems   Constitutional: Negative for activity change and fever. Eyes: Positive for pain. Respiratory: Negative for cough. Gastrointestinal: Negative for abdominal pain, diarrhea, nausea and vomiting. Musculoskeletal: Negative for arthralgias and myalgias. Skin: Negative for rash and wound. Neurological: Negative for syncope and headaches. Psychiatric/Behavioral: Negative for confusion. All other systems reviewed and are negative. Physical Exam     Physical Exam  Vitals and nursing note reviewed. Constitutional:       Appearance: Normal appearance. She is normal weight. HENT:      Head: Normocephalic and atraumatic. Nose: Nose normal.      Mouth/Throat:      Mouth: Mucous membranes are moist.   Eyes:      Extraocular Movements: Extraocular movements intact. Pupils: Pupils are equal, round, and reactive to light. Comments: Left eye is normal.  Right with conjunctival injection, watery. Cardiovascular:      Rate and Rhythm: Normal rate. Pulses: Normal pulses. Pulmonary:      Effort: Pulmonary effort is normal. No respiratory distress. Musculoskeletal:         General: No swelling or deformity. Normal range of motion. Skin:     General: Skin is warm and dry. Findings: No rash. Neurological:      General: No focal deficit present. Mental Status: She is alert.    Psychiatric:         Mood and Affect: Mood normal.         Behavior: Behavior normal.         Lab and Diagnostic Study Results     Labs -   No results found for this or any previous visit (from the past 12 hour(s)). Radiologic Studies -   @lastxrresult@  CT Results  (Last 48 hours)    None        CXR Results  (Last 48 hours)    None            Medical Decision Making   - I am the first provider for this patient. - I reviewed the vital signs, available nursing notes, past medical history, past surgical history, family history and social history. - Initial assessment performed. The patients presenting problems have been discussed, and they are in agreement with the care plan formulated and outlined with them. I have encouraged them to ask questions as they arise throughout their visit. Vital Signs-Reviewed the patient's vital signs. Patient Vitals for the past 12 hrs:   Temp Pulse Resp BP SpO2   07/16/22 0957 -- -- -- (!) 170/96 --   07/16/22 0955 98.2 °F (36.8 °C) 86 18 -- 96 %       Records Reviewed: Nursing Notes        ED Course:     ED Course as of 07/16/22 1332   Sat Jul 16, 2022   84 45-year-old female presents for cute onset of right eye pain. Patient was doing yard work and probably something got in her eye. It is now very painful. She does not wear contacts. Eye is injected and watery. Normal eye pressures. Does have a corneal abrasion at 12:00 on fluorescein exam.  No foreign bodies appreciated. Did berenice eyelid. We will flushed with 1 L of normal saline and Ej lens. Patient has significant pain improved with tetracaine. Will prescribe antibiotic ointment and recommend NSAIDs for pain control. Outpatient follow-up with ophthalmology. [LW]   1030 Visual acuity is as follows. Both eyes 20/14, left eye 20/14, right eye 20/30. [LW]      ED Course User Index  [LW] Sue Molina MD     .edproc    Disposition   Disposition: DC- Adult Discharges: All of the diagnostic tests were reviewed and questions answered.  Diagnosis, care plan and treatment options were discussed. The patient understands the instructions and will follow up as directed. The patients results have been reviewed with them. They have been counseled regarding their diagnosis. The patient verbally convey understanding and agreement of the signs, symptoms, diagnosis, treatment and prognosis and additionally agrees to follow up as recommended with their PCP in 24 - 48 hours. They also agree with the care-plan and convey that all of their questions have been answered. I have also put together some discharge instructions for them that include: 1) educational information regarding their diagnosis, 2) how to care for their diagnosis at home, as well a 3) list of reasons why they would want to return to the ED prior to their follow-up appointment, should their condition change. Discharged    DISCHARGE PLAN:  1. Current Discharge Medication List      CONTINUE these medications which have NOT CHANGED    Details   spironolactone (ALDACTONE) 25 mg tablet TAKE 1 TABLET BY MOUTH DAILY FOR BLOOD PRESSURE      albuterol (PROVENTIL HFA, VENTOLIN HFA, PROAIR HFA) 90 mcg/actuation inhaler Take 1 Puff by inhalation every four (4) hours as needed for Wheezing. Qty: 6.7 g, Refills: 1           2. Follow-up Information     Follow up With Specialties Details Why [address]  Schedule an appointment as soon as possible for a visit in 2 days  [address] [address]    [facility]  As needed [address] [place] [id number] [phone]        3. Return to ED if worse   4. Discharge Medication List as of 7/16/2022 11:45 AM      START taking these medications    Details   erythromycin (ILOTYCIN) ophthalmic ointment Use 1 inch ribbon 4 time daily, Normal, Disp-3.5 g, R-0      naproxen (Naprosyn) 500 mg tablet Take 1 Tablet by mouth two (2) times daily (with meals) for 10 days. , Normal, Disp-20 Tablet, R-0         CONTINUE these medications which have NOT CHANGED    Details   spironolactone (ALDACTONE) 25 mg tablet TAKE 1 TABLET BY MOUTH DAILY FOR BLOOD PRESSURE, Historical Med      albuterol (PROVENTIL HFA, VENTOLIN HFA, PROAIR HFA) 90 mcg/actuation inhaler Take 1 Puff by inhalation every four (4) hours as needed for Wheezing., Normal, Disp-6.7 g, R-1               Diagnosis     Clinical Impression:   1. Abrasion of right cornea, initial encounter        Attestations:    Radha Dent MD    Please note that this dictation was completed with [employer], the [employer] voice recognition software. Quite often unanticipated grammatical, syntax, homophones, and other interpretive errors are inadvertently transcribed by the computer software. Please disregard these errors. Please excuse any errors that have escaped final proofreading. Thank you.

## 2022-07-16 NOTE — Clinical Note
[address]  [address] [facility] [address]  [phone]    Work/School Note    Date: 7/16/2022    To Whom It May concern:    Fred Diggs was seen and treated today in the emergency room by the following provider(s):  Attending Provider: Tim Hsieh MD.      Fred Diggs is excused from work/school on 07/16/22 and 07/17/22. She is medically clear to return to work/school on 7/18/2022.        Sincerely,          Mitesh Carter MD

## 2022-08-23 ENCOUNTER — HOSPITAL ENCOUNTER (EMERGENCY)
Age: 47
Discharge: HOME | End: 2022-08-23
Attending: EMERGENCY MEDICINE
Payer: MEDICAID

## 2022-08-23 VITALS
SYSTOLIC BLOOD PRESSURE: 174 MMHG | OXYGEN SATURATION: 98 % | RESPIRATION RATE: 16 BRPM | DIASTOLIC BLOOD PRESSURE: 93 MMHG | HEIGHT: 68 IN | BODY MASS INDEX: 29.86 KG/M2 | TEMPERATURE: 98 F | HEART RATE: 86 BPM | WEIGHT: 197 LBS

## 2022-08-23 DIAGNOSIS — L23.7 POISON IVY DERMATITIS: Primary | ICD-10-CM

## 2022-08-23 PROCEDURE — 99284 EMERGENCY DEPT VISIT MOD MDM: CPT

## 2022-08-23 PROCEDURE — 96372 THER/PROPH/DIAG INJ SC/IM: CPT

## 2022-08-23 PROCEDURE — 74011250636 HC RX REV CODE- 250/636: Performed by: EMERGENCY MEDICINE

## 2022-08-23 RX ORDER — PREDNISONE 20 MG/1
TABLET ORAL
Qty: 12 TABLET | Refills: 0 | Status: SHIPPED | OUTPATIENT
Start: 2022-08-23

## 2022-08-23 RX ORDER — DEXAMETHASONE SODIUM PHOSPHATE 10 MG/ML
10 INJECTION INTRAMUSCULAR; INTRAVENOUS ONCE
Status: COMPLETED | OUTPATIENT
Start: 2022-08-23 | End: 2022-08-23

## 2022-08-23 RX ADMIN — DEXAMETHASONE SODIUM PHOSPHATE 10 MG: 10 INJECTION, SOLUTION INTRAMUSCULAR; INTRAVENOUS at 22:46

## 2022-08-24 NOTE — ED PROVIDER NOTES
EMERGENCY DEPARTMENT HISTORY AND PHYSICAL EXAM      Date: 2022  Patient Name: Topher Love    History of Presenting Illness     Chief Complaint   Patient presents with    Rash       History Provided By: Patient    HPI: Topher Love, 56 y.o. female   presents to the ED with cc of rash. Patient complains of itchy rash on her left arm and back after a contact with poison ivy doing her yard work yesterday. No signs angioedema. No joint pain. No fever chills. No OTC treatment. PCP: Lady Milagro MD    No current facility-administered medications on file prior to encounter. Current Outpatient Medications on File Prior to Encounter   Medication Sig Dispense Refill    spironolactone (ALDACTONE) 25 mg tablet TAKE 1 TABLET BY MOUTH DAILY FOR BLOOD PRESSURE      albuterol (PROVENTIL HFA, VENTOLIN HFA, PROAIR HFA) 90 mcg/actuation inhaler Take 1 Puff by inhalation every four (4) hours as needed for Wheezing. 6.7 g 1       Past History     Past Medical History:  Past Medical History:   Diagnosis Date    Hypertension, essential 2020    Impaired fasting glucose 2020    Nicotine dependence, other tobacco product, uncomplicated 6286    Obesity, unspecified 2020    Pure hypercholesterolemia 2020    pt states not on medication anymore       Past Surgical History:  Past Surgical History:   Procedure Laterality Date    HX  SECTION      HX  SECTION      HX TONSILLECTOMY      HX WISDOM TEETH EXTRACTION         Family History:  Family History   Problem Relation Age of Onset    Stroke Mother     Cancer Father        Social History:  Social History     Tobacco Use    Smoking status: Every Day     Packs/day: 0.25     Years: 20.00     Pack years: 5.00     Types: Cigarettes    Smokeless tobacco: Never    Tobacco comments:     states 4 a day   Substance Use Topics    Alcohol use: Not Currently    Drug use: Never       Allergies:   Allergies   Allergen Reactions    Sulfa (Sulfonamide Antibiotics) Swelling     Eyes    Amlodipine Swelling         Review of Systems   Review of Systems   Constitutional:  Negative for chills and fever. HENT:  Negative for sore throat. Eyes:  Negative for discharge. Respiratory:  Negative for shortness of breath. Cardiovascular:  Negative for chest pain. Gastrointestinal:  Negative for abdominal pain. Skin:  Positive for rash. Neurological:  Negative for headaches. Physical Exam   Physical Exam  Vitals and nursing note reviewed. Constitutional:       General: She is not in acute distress. Appearance: Normal appearance. She is well-developed. She is not ill-appearing or toxic-appearing. HENT:      Head: Normocephalic and atraumatic. Nose: No congestion. Mouth/Throat:      Mouth: Mucous membranes are moist.   Eyes:      Conjunctiva/sclera: Conjunctivae normal.   Cardiovascular:      Rate and Rhythm: Regular rhythm. Heart sounds: Normal heart sounds. Pulmonary:      Effort: Pulmonary effort is normal.      Breath sounds: Normal breath sounds. Abdominal:      General: Bowel sounds are normal.      Palpations: Abdomen is soft. Tenderness: There is no abdominal tenderness. Musculoskeletal:         General: No deformity. Cervical back: Neck supple. Skin:     General: Skin is warm and dry. Comments: A patch of weeping maculopapular lesions on left forearm    Neurological:      General: No focal deficit present. Mental Status: She is alert. Psychiatric:         Mood and Affect: Mood normal.       Diagnostic Study Results     Labs -   No results found for this or any previous visit (from the past 12 hour(s)). Radiologic Studies -   No orders to display     CT Results  (Last 48 hours)      None          CXR Results  (Last 48 hours)      None              Medical Decision Making   I am the first provider for this patient.     I reviewed the vital signs, available nursing notes, past medical history, past surgical history, family history and social history. Vital Signs-Reviewed the patient's vital signs. Patient Vitals for the past 12 hrs:   Temp Pulse Resp BP SpO2   08/23/22 2221 98 °F (36.7 °C) 86 16 (!) 174/93 98 %       Records Reviewed:     Provider Notes (Medical Decision Making):       ED Course:   Initial assessment performed. The patients presenting problems have been discussed, and they are in agreement with the care plan formulated and outlined with them. I have encouraged them to ask questions as they arise throughout their visit. PROCEDURES      Disposition: Condition stable   DC- Adult Discharges: All of the diagnostic tests were reviewed and questions answered. Diagnosis, care plan and treatment options were discussed. understand instructions and will follow up as directed. The patients results have been reviewed with them. They have been counseled regarding their diagnosis. The patient verbally convey understanding and agreement of the signs, symptoms, diagnosis, treatment and prognosis and additionally agrees to follow up as recommended. They also agree with the care-plan and convey that all of their questions have been answered. I have also put together some discharge instructions for them that include: 1) educational information regarding their diagnosis, 2) how to care for their diagnosis at home, as well a 3) list of reasons why they would want to return to the ED prior to their follow-up appointment, should their condition change. PLAN:  1. Current Discharge Medication List        START taking these medications    Details   predniSONE (DELTASONE) 20 mg tablet 3 tablets for 2 days then 2 tablets for 2 days then 1 tablet for 2 day  Qty: 12 Tablet, Refills: 0  Start date: 8/23/2022           2.    Follow-up Information       Follow up With Specialties Details Why Contact Info    Follow up with your primary care physician  Schedule an appointment as soon as possible for a visit in 3 days As needed           Return to ED if worse     Diagnosis     Clinical Impression:   1. Poison ivy dermatitis        Please note that this dictation was completed with [employer], the computer voice recognition software. Quite often unanticipated grammatical, syntax, homophones, and other interpretive errors are inadvertently transcribed by the computer software. Please disregard these errors. Please excuse any errors that have escaped final proofreading. Thank you.

## 2023-01-05 ENCOUNTER — HOSPITAL ENCOUNTER (EMERGENCY)
Age: 48
Discharge: HOME | End: 2023-01-05
Attending: EMERGENCY MEDICINE
Payer: MEDICAID

## 2023-01-05 VITALS
TEMPERATURE: 97.5 F | BODY MASS INDEX: 29.86 KG/M2 | DIASTOLIC BLOOD PRESSURE: 112 MMHG | OXYGEN SATURATION: 98 % | SYSTOLIC BLOOD PRESSURE: 160 MMHG | RESPIRATION RATE: 17 BRPM | WEIGHT: 197 LBS | HEIGHT: 68 IN | HEART RATE: 77 BPM

## 2023-01-05 DIAGNOSIS — K06.8 PAIN IN GUMS: Primary | ICD-10-CM

## 2023-01-05 PROCEDURE — 99282 EMERGENCY DEPT VISIT SF MDM: CPT

## 2023-01-05 NOTE — Clinical Note
[address]  [address] [place] [id number]  [phone]    Work/School Note    Date: 1/5/2023    To Whom It May concern:    Gerardo Perales was seen and treated today in the emergency room by the following provider(s):  Attending Provider: Radha Rendon MD.      Gerardo Perales is excused from work/school on 01/05/23 and 01/06/23. She is medically clear to return to work/school on 1/7/2023.        Sincerely,          Nathanael Cruz MD

## 2023-01-06 NOTE — ED TRIAGE NOTES
Pt reports concern about her left lower molar extraction possibly having fragments left over preventing her from closing her mouth completely.

## 2023-01-08 ENCOUNTER — HOSPITAL ENCOUNTER (EMERGENCY)
Age: 48
Discharge: HOME | End: 2023-01-08
Attending: FAMILY MEDICINE
Payer: MEDICAID

## 2023-01-08 VITALS
WEIGHT: 210 LBS | TEMPERATURE: 98.9 F | RESPIRATION RATE: 18 BRPM | OXYGEN SATURATION: 100 % | DIASTOLIC BLOOD PRESSURE: 96 MMHG | BODY MASS INDEX: 31.83 KG/M2 | HEART RATE: 76 BPM | HEIGHT: 68 IN | SYSTOLIC BLOOD PRESSURE: 168 MMHG

## 2023-01-08 DIAGNOSIS — R22.0 SWELLING OF GUMS: Primary | ICD-10-CM

## 2023-01-08 PROCEDURE — 99283 EMERGENCY DEPT VISIT LOW MDM: CPT

## 2023-01-08 PROCEDURE — 74011250637 HC RX REV CODE- 250/637: Performed by: FAMILY MEDICINE

## 2023-01-08 RX ORDER — CLINDAMYCIN HYDROCHLORIDE 150 MG/1
300 CAPSULE ORAL ONCE
Status: COMPLETED | OUTPATIENT
Start: 2023-01-08 | End: 2023-01-08

## 2023-01-08 RX ORDER — CHLORHEXIDINE GLUCONATE 1.2 MG/ML
RINSE ORAL
COMMUNITY
Start: 2023-01-04

## 2023-01-08 RX ORDER — CLINDAMYCIN HYDROCHLORIDE 300 MG/1
300 CAPSULE ORAL 4 TIMES DAILY
Qty: 28 CAPSULE | Refills: 0 | Status: SHIPPED | OUTPATIENT
Start: 2023-01-08 | End: 2023-01-15

## 2023-01-08 RX ORDER — BENZOIN
TINCTURE TOPICAL
COMMUNITY
Start: 2023-01-04

## 2023-01-08 RX ORDER — IBUPROFEN 600 MG/1
TABLET ORAL
COMMUNITY
Start: 2023-01-04

## 2023-01-08 RX ORDER — OXYCODONE HYDROCHLORIDE 5 MG/1
5 TABLET ORAL
COMMUNITY
Start: 2023-01-04

## 2023-01-08 RX ADMIN — CLINDAMYCIN HYDROCHLORIDE 300 MG: 150 CAPSULE ORAL at 17:06

## 2023-01-08 NOTE — Clinical Note
[address]  [address] [facility] [address]  [phone]    Work/School Note    Date: 1/8/2023    To Whom It May concern:    Terris Kussmaul was seen and treated today in the emergency room by the following provider(s):  Attending Provider: Mabel Felty, DO. Terris Kussmaul is excused from work/school on 1/8/2023 through 1/10/2023. She is medically clear to return to work/school on 1/11/2023.          Sincerely,          Krissy Cloud DO

## 2023-01-08 NOTE — ED TRIAGE NOTES
Pt had all her teeth pulled Wednesday in readiness for implants. Sucked on a milkshake straw without thinking. Both sided of gums are swollen.

## 2023-01-12 NOTE — ED PROVIDER NOTES
EMERGENCY DEPARTMENT HISTORY AND PHYSICAL EXAM      Date: 2023  Patient Name: Alice Mccracken    History of Presenting Illness     Chief Complaint   Patient presents with    Gum Problem       History Provided By:     HPI: Alice Mccracken, is a very pleasant 56 y.o. female presenting to the ED with a chief complaint of gum problem. Patient states she recently had all of her teeth extracted in preparation for implants. Since this time she has noticed her gums are slightly swollen. They are tender to the touch. This occurred after using a straw on accident. No swelling under tongue. No difficulty swallowing. No neck pain. No fevers chills rigors. The patient denies any other symptoms at this time. PCP: Jim Carrasco MD    No current facility-administered medications on file prior to encounter. Current Outpatient Medications on File Prior to Encounter   Medication Sig Dispense Refill    chlorhexidine (PERIDEX) 0.12 % solution GENTLY SWISH AND SPIT 15 ML BY MOUTH TWICE DAILY AS DIRECTED      oxyCODONE IR (ROXICODONE) 5 mg immediate release tablet Take 5 mg by mouth every six (6) hours as needed. ibuprofen (MOTRIN) 600 mg tablet TAKE 1 TABLET BY MOUTH EVERY 6 HOURS AS DIRECTED      Pain Reliever ES,acetaminophn, 500 mg tablet TAKE 1 CAPLET BY MOUTH EVERY 6 TO 8 HOURS.  DO NOT TAKE IN COMBINATION WITH OTHER MEDICATIONS INCULDING ACTEAMINOPHEN      spironolactone (ALDACTONE) 25 mg tablet TAKE 1 TABLET BY MOUTH DAILY FOR BLOOD PRESSURE         Past History     Past Medical History:  Past Medical History:   Diagnosis Date    Hypertension, essential 2020    Impaired fasting glucose 2020    Nicotine dependence, other tobacco product, uncomplicated     Obesity, unspecified 2020    Pure hypercholesterolemia 2020    pt states not on medication anymore       Past Surgical History:  Past Surgical History:   Procedure Laterality Date    [address] [place]    HX  SECTION  2011    HX TONSILLECTOMY      HX WISDOM TEETH EXTRACTION         Family History:  Family History   Problem Relation Age of Onset    Stroke Mother     Cancer Father        Social History:  Social History     Tobacco Use    Smoking status: Every Day     Packs/day: 0.25     Years: 20.00     Pack years: 5.00     Types: Cigarettes    Smokeless tobacco: Never    Tobacco comments:     states 4 a day   Substance Use Topics    Alcohol use: Not Currently    Drug use: Never       Allergies: Allergies   Allergen Reactions    Sulfa (Sulfonamide Antibiotics) Swelling     Eyes    Amlodipine Swelling         Review of Systems     Review of Systems   Constitutional:  Negative for activity change, appetite change, chills, fatigue and fever. HENT:  Negative for congestion and sore throat. Eyes:  Negative for photophobia and visual disturbance. Respiratory:  Negative for cough, shortness of breath and wheezing. Cardiovascular:  Negative for chest pain, palpitations and leg swelling. Gastrointestinal:  Negative for abdominal pain, diarrhea, nausea and vomiting. Endocrine: Negative for cold intolerance and heat intolerance. Musculoskeletal:  Negative for gait problem and joint swelling. Skin:  Negative for color change and rash. Neurological:  Negative for dizziness and headaches. Physical Exam     Physical Exam  Constitutional:       Appearance: She is well-developed. HENT:      Head: Normocephalic and atraumatic. Mouth/Throat:      Mouth: Mucous membranes are moist.      Pharynx: Oropharynx is clear. Comments: All teeth extracted, sutures in place, minimal swelling upper and lower gum lines. No palpable fluctuance. No swelling under tongue. Patient tolerating secretions with ease. No change in voice. No tenderness of mandible nor maxilla  Eyes:      Conjunctiva/sclera: Conjunctivae normal.      Pupils: Pupils are equal, round, and reactive to light.    Cardiovascular:      Rate and Rhythm: Normal rate and regular rhythm. Pulses: Normal pulses. Heart sounds: Normal heart sounds. No murmur heard. Pulmonary:      Effort: No respiratory distress. Breath sounds: No stridor. No wheezing, rhonchi or rales. Abdominal:      General: There is no distension. Tenderness: There is no abdominal tenderness. There is no rebound. Musculoskeletal:      Cervical back: Normal range of motion and neck supple. Skin:     General: Skin is warm and dry. Neurological:      General: No focal deficit present. Mental Status: She is alert and oriented to person, place, and time. Psychiatric:         Mood and Affect: Mood normal.         Behavior: Behavior normal.       Lab and Diagnostic Study Results     Labs -   No results found for this or any previous visit (from the past 12 hour(s)). Radiologic Studies -   @lastxrresult@  CT Results  (Last 48 hours)      None          CXR Results  (Last 48 hours)      None              Medical Decision Making   - I am the first provider for this patient. - I reviewed the vital signs, available nursing notes, past medical history, past surgical history, family history and social history. - Initial assessment performed. The patients presenting problems have been discussed, and they are in agreement with the care plan formulated and outlined with them. I have encouraged them to ask questions as they arise throughout their visit. Vital Signs-Reviewed the patient's vital signs. No data found. ED Course/ Provider Notes (Medical Decision Making):     Patient presented to the emergency department with the aforementioned chief complaint. On examination the patient is nontoxic. Vitals were reviewed per above. Patient is status post complete dental extraction. No identifiable abscess. Will treat with antibiotics in case of early gum infection.   Patient to call dentist today to make an appointment    Mela Ulloa was given a thorough list of signs and symptoms that would warrant an immediate return to the emergency department. Otherwise Shima Kimbrough will follow up with PCP and dentist.       Procedures   Medical Decision Makingedical Decision Making  Performed by: Damián Lester,   Procedures  None       Disposition   Disposition:     Home     All of the diagnostic tests were reviewed and questions answered. Diagnosis, care plan and treatment options were discussed. The patient understands the instructions and will follow up as directed. The patients results have been reviewed with them. They have been counseled regarding their diagnosis. The patient verbally convey understanding and agreement of the signs, symptoms, diagnosis, treatment and prognosis and additionally agrees to follow up as recommended with their PCP in 24 - 48 hours. They also agree with the care-plan and convey that all of their questions have been answered. I have also put together some discharge instructions for them that include: 1) educational information regarding their diagnosis, 2) how to care for their diagnosis at home, as well a 3) list of reasons why they would want to return to the ED prior to their follow-up appointment, should their condition change. DISCHARGE PLAN:    1. Cannot display discharge medications since this patient is not currently admitted. 2.   Follow-up Information       Follow up With Specialties Details Why Contact Info    Your primary care doctor  Schedule an appointment as soon as possible for a visit in 2 days      Please call your dentist tomorrow morning to make an appointment        Your primary care doctor  Schedule an appointment as soon as possible for a visit in 1 day                3.  Return to ED if worse       4. Discharge Medication List as of 1/8/2023  5:07 PM        START taking these medications    Details   clindamycin (CLEOCIN) 300 mg capsule Take 1 Capsule by mouth four (4) times daily for 7 days. , Normal, Disp-28 Capsule, R-0           CONTINUE these medications which have NOT CHANGED    Details   chlorhexidine (PERIDEX) 0.12 % solution GENTLY SWISH AND SPIT 15 ML BY MOUTH TWICE DAILY AS DIRECTED, Historical Med      oxyCODONE IR (ROXICODONE) 5 mg immediate release tablet Take 5 mg by mouth every six (6) hours as needed., Historical Med      ibuprofen (MOTRIN) 600 mg tablet TAKE 1 TABLET BY MOUTH EVERY 6 HOURS AS DIRECTED, Historical Med      Pain Reliever ES,acetaminophn, 500 mg tablet TAKE 1 CAPLET BY MOUTH EVERY 6 TO 8 HOURS. DO NOT TAKE IN COMBINATION WITH OTHER MEDICATIONS INCULDING ACTEAMINOPHEN, Historical Med, MELANI      spironolactone (ALDACTONE) 25 mg tablet TAKE 1 TABLET BY MOUTH DAILY FOR BLOOD PRESSURE, Historical Med               Diagnosis     Clinical Impression:    1. Swelling of gums        Attestations:    Gisella Peter, DO    Please note that this dictation was completed with [employer], the computer voice recognition software. Quite often unanticipated grammatical, syntax, homophones, and other interpretive errors are inadvertently transcribed by the computer software. Please disregard these errors. Please excuse any errors that have escaped final proofreading. Thank you.

## 2023-04-17 NOTE — ED PROVIDER NOTES
EMERGENCY DEPARTMENT HISTORY AND PHYSICAL EXAM      Date: 2023  Patient Name: Gunner Gan    History of Presenting Illness     Chief Complaint   Patient presents with    Dental Problem       History Provided By: Patient    HPI: Gunner Gan, 56 y.o. female   presents to the ED with cc of swelling. Patient who recently underwent multiple dental extraction came to emergency room complaining of gum swelling over the left lower molar. Patient states that she is compliant with the antibiotic that was prescribed by dentist.  Patient is dental appointment for follow-up next week. No facial swelling. No fever chills. No difficulty swallowing. PCP: Cristal Lopes MD    No current facility-administered medications on file prior to encounter. Current Outpatient Medications on File Prior to Encounter   Medication Sig Dispense Refill    predniSONE (DELTASONE) 20 mg tablet 3 tablets for 2 days then 2 tablets for 2 days then 1 tablet for 2 day 12 Tablet 0    spironolactone (ALDACTONE) 25 mg tablet TAKE 1 TABLET BY MOUTH DAILY FOR BLOOD PRESSURE      albuterol (PROVENTIL HFA, VENTOLIN HFA, PROAIR HFA) 90 mcg/actuation inhaler Take 1 Puff by inhalation every four (4) hours as needed for Wheezing.  6.7 g 1       Past History     Past Medical History:  Past Medical History:   Diagnosis Date    Hypertension, essential 2020    Impaired fasting glucose 2020    Nicotine dependence, other tobacco product, uncomplicated     Obesity, unspecified 2020    Pure hypercholesterolemia 2020    pt states not on medication anymore       Past Surgical History:  Past Surgical History:   Procedure Laterality Date    HX  SECTION      HX  SECTION      HX TONSILLECTOMY      HX WISDOM TEETH EXTRACTION         Family History:  Family History   Problem Relation Age of Onset    Stroke Mother     Cancer Father        Social History:  Social History     Tobacco Use    Smoking status: Every Day     Packs/day: 0.25     Years: 20.00     Pack years: 5.00     Types: Cigarettes    Smokeless tobacco: Never    Tobacco comments:     states 4 a day   Substance Use Topics    Alcohol use: Not Currently    Drug use: Never       Allergies: Allergies   Allergen Reactions    Sulfa (Sulfonamide Antibiotics) Swelling     Eyes    Amlodipine Swelling         Review of Systems   Review of Systems   Constitutional:  Negative for chills and fever. HENT:  Negative for sore throat. Eyes:  Negative for discharge. Respiratory:  Negative for shortness of breath. Cardiovascular:  Negative for chest pain. Gastrointestinal:  Negative for abdominal pain. Neurological:  Negative for headaches. Physical Exam   Physical Exam  Vitals and nursing note reviewed. Constitutional:       General: She is not in acute distress. Appearance: Normal appearance. She is not ill-appearing, toxic-appearing or diaphoretic. HENT:      Head: Normocephalic and atraumatic. Mouth/Throat:      Mouth: Mucous membranes are moist.      Comments: Status post tubal dental extraction with no residual tooth. Signs of gum abscess or facial swelling. Eyes:      Conjunctiva/sclera: Conjunctivae normal.   Pulmonary:      Effort: Pulmonary effort is normal.   Musculoskeletal:      Cervical back: Neck supple. Skin:     General: Skin is warm and dry. Neurological:      General: No focal deficit present. Mental Status: She is alert. Psychiatric:         Behavior: Behavior normal.       Diagnostic Study Results     Labs -   No results found for this or any previous visit (from the past 12 hour(s)). Radiologic Studies -   No orders to display     CT Results  (Last 48 hours)      None          CXR Results  (Last 48 hours)      None              Medical Decision Making   I am the first provider for this patient.     I reviewed the vital signs, available nursing notes, past medical history, past surgical history, family history and social history. Vital Signs-Reviewed the patient's vital signs. Patient Vitals for the past 12 hrs:   Temp Pulse Resp BP SpO2   01/05/23 1932 97.5 °F (36.4 °C) 77 17 (!) 160/112 98 %       Records Reviewed:     Provider Notes (Medical Decision Making):   Patient presented with complaining of gum swelling after multiple dental extraction recently. Clinically there is no signs and symptoms of gum abscess or facial infection. No muffled voice. No stridor. I discussed with the patient that the swelling of the gum is expected after multiple dental extraction. Patient was discharged with recommendation to continue with antibiotic as prescribed and follow-up with her dentist as planned. ED Course:   Initial assessment performed. The patients presenting problems have been discussed, and they are in agreement with the care plan formulated and outlined with them. I have encouraged them to ask questions as they arise throughout their visit. PROCEDURES      Disposition: Condition stable   DC- Adult Discharges: All of the diagnostic tests were reviewed and questions answered. Diagnosis, care plan and treatment options were discussed. understand instructions and will follow up as directed. The patients results have been reviewed with them. They have been counseled regarding their diagnosis. The patient verbally convey understanding and agreement of the signs, symptoms, diagnosis, treatment and prognosis and additionally agrees to follow up as recommended. They also agree with the care-plan and convey that all of their questions have been answered. I have also put together some discharge instructions for them that include: 1) educational information regarding their diagnosis, 2) how to care for their diagnosis at home, as well a 3) list of reasons why they would want to return to the ED prior to their follow-up appointment, should their condition change. PLAN:  1.    Discharge Medication List as of 1/5/2023  7:36 PM        2. Follow-up Information       Follow up With Specialties Details Why Contact Info    Follow-up with a dentist of your choice  Schedule an appointment as soon as possible for a visit             Return to ED if worse     Diagnosis     Clinical Impression:   1. Pain in gums        Please note that this dictation was completed with [employer], the computer voice recognition software. Quite often unanticipated grammatical, syntax, homophones, and other interpretive errors are inadvertently transcribed by the computer software. Please disregard these errors. Please excuse any errors that have escaped final proofreading. Thank you. Casandra Gibbons(Attending)

## 2023-05-09 ENCOUNTER — HOSPITAL ENCOUNTER (EMERGENCY)
Facility: HOSPITAL | Age: 48
Discharge: HOME | End: 2023-05-09
Payer: MEDICAID

## 2023-05-09 ENCOUNTER — HOSPITAL ENCOUNTER (OUTPATIENT)
Facility: HOSPITAL | Age: 48
Discharge: HOME | End: 2023-05-11
Payer: MEDICAID

## 2023-05-09 VITALS
HEART RATE: 89 BPM | DIASTOLIC BLOOD PRESSURE: 98 MMHG | OXYGEN SATURATION: 98 % | HEIGHT: 68 IN | SYSTOLIC BLOOD PRESSURE: 158 MMHG | TEMPERATURE: 98.8 F | BODY MASS INDEX: 32.89 KG/M2 | WEIGHT: 217 LBS | RESPIRATION RATE: 16 BRPM

## 2023-05-09 DIAGNOSIS — M79.662 PAIN OF LEFT CALF: ICD-10-CM

## 2023-05-09 DIAGNOSIS — R60.9 SWELLING: ICD-10-CM

## 2023-05-09 DIAGNOSIS — I10 PRIMARY HYPERTENSION: Primary | ICD-10-CM

## 2023-05-09 LAB — ECHO BSA: 2.17 M2

## 2023-05-09 PROCEDURE — 93971 EXTREMITY STUDY: CPT

## 2023-05-09 PROCEDURE — 99282 EMERGENCY DEPT VISIT SF MDM: CPT

## 2023-05-09 RX ORDER — SPIRONOLACTONE 25 MG/1
25 TABLET ORAL DAILY
COMMUNITY

## 2023-05-09 ASSESSMENT — LIFESTYLE VARIABLES
HOW MANY STANDARD DRINKS CONTAINING ALCOHOL DO YOU HAVE ON A TYPICAL DAY: PATIENT DOES NOT DRINK
HOW OFTEN DO YOU HAVE A DRINK CONTAINING ALCOHOL: NEVER

## 2023-05-09 ASSESSMENT — PAIN - FUNCTIONAL ASSESSMENT: PAIN_FUNCTIONAL_ASSESSMENT: NONE - DENIES PAIN

## 2023-05-09 NOTE — ED PROVIDER NOTES
[facility] EMERGENCY DEPARTMENT  EMERGENCY DEPARTMENT HISTORY AND PHYSICAL EXAM      Date: 2023  Patient Name: Vanessa Bernard  MRN: [id number]  YOB: 1975  Date of evaluation: 2023  Provider: Herber Murray PA-C   Note Started: 8:04 AM EDT 23    HISTORY OF PRESENT ILLNESS     Chief Complaint   Patient presents with    Hypertension    Foot Swelling       History Provided By: Patient    HPI: Vanessa Bernard is a 56 y.o. female past medical history including hypertension presents to the emergency room today with complaint of left lower extremity swelling starting 1 day ago. She reports feeling her distal extremity swelling whenever her blood pressure is elevated. States that has never been this bad states that she feels tightness and swelling to the middle of her calf. Describes it as a tightness and tingling feeling. States that she smokes about 3 cigarettes a day. She denies any chest pain, shortness, palpitations, dumping, nausea, vomiting, diarrhea, cough at this time. PAST MEDICAL HISTORY   Past Medical History:  Past Medical History:   Diagnosis Date    Hypertension, essential 2020    Impaired fasting glucose 2020    Nicotine dependence, other tobacco product, uncomplicated     Obesity, unspecified 2020    Pure hypercholesterolemia 2020    pt states not on medication anymore       Past Surgical History:  Past Surgical History:   Procedure Laterality Date     SECTION  2011     SECTION  1993    TONSILLECTOMY      WISDOM TOOTH EXTRACTION         Family History:  Family History   Problem Relation Age of Onset    Cancer Father     Stroke Mother        Social History:  Social History     Tobacco Use    Smoking status: Every Day     Packs/day: 0.25     Types: Cigarettes    Smokeless tobacco: Never    Tobacco comments:     Quit smoking: states 4 a day   Substance Use Topics    Alcohol use: Not Currently    Drug use: Never       Allergies:   Allergies

## 2023-05-09 NOTE — ED TRIAGE NOTES
Pt reports elevated BP that began yesterday, pt takes Spironolactone 25 mg 1 tab QD and has been taking her meds. Also reports left foot swelling and \"tightness\" to calf area for the same amount of time. Denies CP or SOB. BP currently 158/98. Sent here for work clearance.

## 2023-05-21 RX ORDER — SPIRONOLACTONE 25 MG/1
1 TABLET ORAL DAILY
COMMUNITY
Start: 2022-04-24

## 2023-05-21 RX ORDER — CHLORHEXIDINE GLUCONATE 0.12 MG/ML
RINSE ORAL
COMMUNITY
Start: 2023-01-04

## 2023-05-21 RX ORDER — OXYCODONE HYDROCHLORIDE 5 MG/1
5 TABLET ORAL EVERY 6 HOURS PRN
COMMUNITY
Start: 2023-01-04

## 2023-05-21 RX ORDER — ACETAMINOPHEN 500 MG
TABLET ORAL
COMMUNITY
Start: 2023-01-04

## 2023-05-21 RX ORDER — IBUPROFEN 600 MG/1
TABLET ORAL
COMMUNITY
Start: 2023-01-04

## 2023-08-12 ENCOUNTER — HOSPITAL ENCOUNTER (EMERGENCY)
Facility: HOSPITAL | Age: 48
Discharge: HOME | End: 2023-08-12
Attending: EMERGENCY MEDICINE
Payer: MEDICAID

## 2023-08-12 VITALS
HEART RATE: 92 BPM | SYSTOLIC BLOOD PRESSURE: 165 MMHG | WEIGHT: 195 LBS | TEMPERATURE: 98.9 F | HEIGHT: 68 IN | OXYGEN SATURATION: 98 % | DIASTOLIC BLOOD PRESSURE: 104 MMHG | BODY MASS INDEX: 29.55 KG/M2 | RESPIRATION RATE: 16 BRPM

## 2023-08-12 DIAGNOSIS — B37.9 YEAST INFECTION: Primary | ICD-10-CM

## 2023-08-12 PROCEDURE — 6370000000 HC RX 637 (ALT 250 FOR IP): Performed by: EMERGENCY MEDICINE

## 2023-08-12 PROCEDURE — 99283 EMERGENCY DEPT VISIT LOW MDM: CPT

## 2023-08-12 RX ORDER — CLOTRIMAZOLE AND BETAMETHASONE DIPROPIONATE 10; .64 MG/G; MG/G
CREAM TOPICAL
Qty: 45 G | Refills: 0 | Status: SHIPPED | OUTPATIENT
Start: 2023-08-12

## 2023-08-12 RX ORDER — FLUCONAZOLE 100 MG/1
200 TABLET ORAL ONCE
Status: COMPLETED | OUTPATIENT
Start: 2023-08-12 | End: 2023-08-12

## 2023-08-12 RX ORDER — FLUCONAZOLE 150 MG/1
150 TABLET ORAL
Qty: 2 TABLET | Refills: 0 | Status: SHIPPED | OUTPATIENT
Start: 2023-08-12 | End: 2023-08-18

## 2023-08-12 RX ADMIN — FLUCONAZOLE 200 MG: 100 TABLET ORAL at 21:42

## 2023-08-12 ASSESSMENT — PAIN SCALES - GENERAL: PAINLEVEL_OUTOF10: 6

## 2023-08-12 ASSESSMENT — PAIN - FUNCTIONAL ASSESSMENT: PAIN_FUNCTIONAL_ASSESSMENT: 0-10

## 2023-08-13 NOTE — ED PROVIDER NOTES
EMERGENCY DEPARTMENT HISTORY AND PHYSICAL EXAM    Date: 2023  Patient Name: Thong Gary    History of Presenting Illness     Chief Complaint   Patient presents with    Rash       History Provided By: Patient    HPI: Thong Gary, 53 y.o. female   presents to the ED with cc of rash. Patient complains of rash to her bilateral breasts for last several days. Mildly itchy and painful. No OTC treatment. .  No fever or chills. No injury. No URI symptoms. No cough. No abdominal pain. PCP: Merly Veloz MD    No current facility-administered medications on file prior to encounter. Current Outpatient Medications on File Prior to Encounter   Medication Sig Dispense Refill    acetaminophen (TYLENOL) 500 MG tablet TAKE 1 CAPLET BY MOUTH EVERY 6 TO 8 HOURS. DO NOT TAKE IN COMBINATION WITH OTHER MEDICATIONS INCULDING ACTEAMINOPHEN      chlorhexidine (PERIDEX) 0.12 % solution GENTLY SWISH AND SPIT 15 ML BY MOUTH TWICE DAILY AS DIRECTED      ibuprofen (ADVIL;MOTRIN) 600 MG tablet TAKE 1 TABLET BY MOUTH EVERY 6 HOURS AS DIRECTED      oxyCODONE (ROXICODONE) 5 MG immediate release tablet Take 1 tablet by mouth every 6 hours as needed.  Max Daily Amount: 20 mg      spironolactone (ALDACTONE) 25 MG tablet Take 1 tablet by mouth daily      spironolactone (ALDACTONE) 25 MG tablet Take 1 tablet by mouth daily         Past History     Past Medical History:  Past Medical History:   Diagnosis Date    Hypertension, essential 2020    Impaired fasting glucose 2020    Nicotine dependence, other tobacco product, uncomplicated     Obesity, unspecified 2020    Pure hypercholesterolemia 2020    pt states not on medication anymore       Past Surgical History:  Past Surgical History:   Procedure Laterality Date     SECTION       SECTION  1993    TONSILLECTOMY      WISDOM TOOTH EXTRACTION         Family History:  Family History   Problem Relation Age of Onset    Cancer Father

## 2024-06-06 ENCOUNTER — APPOINTMENT (OUTPATIENT)
Facility: HOSPITAL | Age: 49
End: 2024-06-06
Payer: MEDICAID

## 2024-06-06 ENCOUNTER — HOSPITAL ENCOUNTER (EMERGENCY)
Facility: HOSPITAL | Age: 49
Discharge: HOME | End: 2024-06-06
Payer: MEDICAID

## 2024-06-06 VITALS
DIASTOLIC BLOOD PRESSURE: 94 MMHG | WEIGHT: 210 LBS | RESPIRATION RATE: 16 BRPM | BODY MASS INDEX: 31.83 KG/M2 | TEMPERATURE: 98.4 F | OXYGEN SATURATION: 98 % | HEART RATE: 99 BPM | HEIGHT: 68 IN | SYSTOLIC BLOOD PRESSURE: 162 MMHG

## 2024-06-06 DIAGNOSIS — S60.222A CONTUSION OF LEFT HAND, INITIAL ENCOUNTER: Primary | ICD-10-CM

## 2024-06-06 DIAGNOSIS — T14.8XXA SUPERFICIAL LACERATION: ICD-10-CM

## 2024-06-06 PROCEDURE — 73130 X-RAY EXAM OF HAND: CPT

## 2024-06-06 PROCEDURE — 6360000002 HC RX W HCPCS: Performed by: NURSE PRACTITIONER

## 2024-06-06 PROCEDURE — 99284 EMERGENCY DEPT VISIT MOD MDM: CPT

## 2024-06-06 PROCEDURE — 90714 TD VACC NO PRESV 7 YRS+ IM: CPT | Performed by: NURSE PRACTITIONER

## 2024-06-06 PROCEDURE — 90471 IMMUNIZATION ADMIN: CPT | Performed by: NURSE PRACTITIONER

## 2024-06-06 RX ADMIN — CLOSTRIDIUM TETANI TOXOID ANTIGEN (FORMALDEHYDE INACTIVATED) AND CORYNEBACTERIUM DIPHTHERIAE TOXOID ANTIGEN (FORMALDEHYDE INACTIVATED) 0.5 ML: 5; 2 INJECTION, SUSPENSION INTRAMUSCULAR at 13:28

## 2024-06-06 ASSESSMENT — PAIN SCALES - GENERAL: PAINLEVEL_OUTOF10: 10

## 2024-06-06 ASSESSMENT — PAIN DESCRIPTION - ORIENTATION: ORIENTATION: LEFT

## 2024-06-06 ASSESSMENT — PAIN - FUNCTIONAL ASSESSMENT: PAIN_FUNCTIONAL_ASSESSMENT: 0-10

## 2024-06-06 ASSESSMENT — PAIN DESCRIPTION - LOCATION: LOCATION: HAND

## 2024-06-06 NOTE — ED TRIAGE NOTES
Left hand injury, got it caught in an osciltating fan a little while ago, also laceration to top of pinky and ring fingers. Hard to move it, very painful

## 2024-06-06 NOTE — ED PROVIDER NOTES
[facility] EMERGENCY DEPT  EMERGENCY DEPARTMENT HISTORY AND PHYSICAL EXAM      Date: 2024  Patient Name: Mavis Olson  MRN: [id number]  YOB: 1975  Date of evaluation: 2024  Provider: DANIEL Kamara NP   Note Started: 1:30 PM EDT 24    HISTORY OF PRESENT ILLNESS     Chief Complaint   Patient presents with    Hand Injury       History Provided By: Patient    HPI: Mavis Olson is a 49 y.o. female ***    PAST MEDICAL HISTORY   Past Medical History:  Past Medical History:   Diagnosis Date    Hypertension, essential 2020    Impaired fasting glucose 2020    Nicotine dependence, other tobacco product, uncomplicated 2020    Obesity, unspecified 2020    Pure hypercholesterolemia 2020    pt states not on medication anymore       Past Surgical History:  Past Surgical History:   Procedure Laterality Date     SECTION  2011     SECTION  1993    TONSILLECTOMY      WISDOM TOOTH EXTRACTION         Family History:  Family History   Problem Relation Age of Onset    Cancer Father     Stroke Mother        Social History:  Social History     Tobacco Use    Smoking status: Every Day     Current packs/day: 0.25     Types: Cigarettes    Smokeless tobacco: Never    Tobacco comments:     Quit smoking: states 4 a day   Substance Use Topics    Alcohol use: Not Currently    Drug use: Never       Allergies:  Allergies   Allergen Reactions    Sulfa Antibiotics Swelling     Eyes    Amlodipine Swelling       PCP: Marciano Machado MD    Current Meds:   No current facility-administered medications for this encounter.     Current Outpatient Medications   Medication Sig Dispense Refill    clotrimazole-betamethasone (LOTRISONE) 1-0.05 % cream Apply topically 2 times daily. 45 g 0    acetaminophen (TYLENOL) 500 MG tablet TAKE 1 CAPLET BY MOUTH EVERY 6 TO 8 HOURS. DO NOT TAKE IN COMBINATION WITH OTHER MEDICATIONS INCULDING ACTEAMINOPHEN      chlorhexidine (PERIDEX) 0.12 % solution GENTLY  Mouth: Mucous membranes are moist.      Pharynx: Uvula midline.   Eyes:      Conjunctiva/sclera: Conjunctivae normal.      Pupils: Pupils are equal, round, and reactive to light.   Cardiovascular:      Rate and Rhythm: Normal rate and regular rhythm.      Heart sounds: Normal heart sounds.   Pulmonary:      Effort: Pulmonary effort is normal.   Abdominal:      Palpations: Abdomen is soft.   Musculoskeletal:      Cervical back: Normal range of motion.   Skin:     General: Skin is warm and dry.   Neurological:      General: No focal deficit present.      Mental Status: She is alert and oriented to person, place, and time.   Psychiatric:         Mood and Affect: Mood normal.           SCREENINGS                  LAB, EKG AND DIAGNOSTIC RESULTS   Labs:  No results found for this or any previous visit (from the past 12 hour(s)).    EKG: Not Applicable    Radiologic Studies:  Non-plain film images such as CT, Ultrasound and MRI are read by the radiologist. Plain radiographic images are visualized and preliminarily interpreted by the ED Physician with the following findings: Not Applicable.    Interpretation per the Radiologist below, if available at the time of this note:  XR HAND LEFT (MIN 3 VIEWS)   Final Result   No acute fracture or dislocation.          Electronically signed by Enoch Montiel MD           ED COURSE and DIFFERENTIAL DIAGNOSIS/MDM   5:51 PM Differential and Considerations: Patient presents with hand pain.  Differential diagnosis includes boxer's fracture, arthritis, carpal tunnel syndrome, fracture.  Patient hurt her hand in a fan.  Patient has small superficial lacerations.  No need for any suturing or closure.  Patient's tetanus was updated and patient will be discharged.    Records Reviewed (source and summary of external notes): Prior medical records and Nursing notes    Vitals:    Vitals:    06/06/24 1213   BP: (!) 162/94   Pulse: 99   Resp: 16   Temp: 98.4 °F (36.9 °C)   SpO2: 98%   Weight: 95.3 kg

## 2024-06-21 ENCOUNTER — HOSPITAL ENCOUNTER (EMERGENCY)
Facility: HOSPITAL | Age: 49
Discharge: HOME | End: 2024-06-21
Payer: MEDICAID

## 2024-06-21 VITALS
OXYGEN SATURATION: 98 % | RESPIRATION RATE: 16 BRPM | SYSTOLIC BLOOD PRESSURE: 145 MMHG | HEIGHT: 68 IN | DIASTOLIC BLOOD PRESSURE: 75 MMHG | TEMPERATURE: 97.6 F | HEART RATE: 98 BPM | BODY MASS INDEX: 31.83 KG/M2 | WEIGHT: 210 LBS

## 2024-06-21 DIAGNOSIS — L25.5 RHUS DERMATITIS: Primary | ICD-10-CM

## 2024-06-21 PROCEDURE — 99283 EMERGENCY DEPT VISIT LOW MDM: CPT

## 2024-06-21 RX ORDER — PREDNISONE 10 MG/1
TABLET ORAL
Qty: 27 TABLET | Refills: 0 | Status: SHIPPED | OUTPATIENT
Start: 2024-06-21

## 2024-06-21 ASSESSMENT — PAIN SCALES - GENERAL: PAINLEVEL_OUTOF10: 0

## 2024-06-21 ASSESSMENT — PAIN - FUNCTIONAL ASSESSMENT: PAIN_FUNCTIONAL_ASSESSMENT: 0-10

## 2024-06-21 NOTE — ED PROVIDER NOTES
MEDICATIONS:     Medication List        START taking these medications      predniSONE 10 MG tablet  Commonly known as: DELTASONE  Take 4tab(40mg)x3days, 3tab(30mg)x3days, 2tab(20mg)x2days, 1tab(10mg)x2days. #27            ASK your doctor about these medications      acetaminophen 500 MG tablet  Commonly known as: TYLENOL     chlorhexidine 0.12 % solution  Commonly known as: PERIDEX     clotrimazole-betamethasone 1-0.05 % cream  Commonly known as: Lotrisone  Apply topically 2 times daily.     ibuprofen 600 MG tablet  Commonly known as: ADVIL;MOTRIN     oxyCODONE 5 MG immediate release tablet  Commonly known as: ROXICODONE     * spironolactone 25 MG tablet  Commonly known as: ALDACTONE     * spironolactone 25 MG tablet  Commonly known as: ALDACTONE           * This list has 2 medication(s) that are the same as other medications prescribed for you. Read the directions carefully, and ask your doctor or other care provider to review them with you.                   Where to Get Your Medications        These medications were sent to [id number] - [place], [place] - [address] - P [phone] - F [phone] [address] [address]      Phone: [phone]   predniSONE 10 MG tablet           DISCONTINUED MEDICATIONS:  Discharge Medication List as of 6/21/2024  9:27 AM          I am the Primary Clinician of Record. TRISHA Roach (electronically signed)    (Please note that parts of this dictation were completed with voice recognition software. Quite often unanticipated grammatical, syntax, homophones, and other interpretive errors are inadvertently transcribed by the computer software. Please disregards these errors. Please excuse any errors that have escaped final proofreading.)     Candi Bello PA  06/21/24 [id number]

## 2024-10-30 ENCOUNTER — APPOINTMENT (OUTPATIENT)
Facility: HOSPITAL | Age: 49
End: 2024-10-30
Payer: MEDICAID

## 2024-10-30 ENCOUNTER — HOSPITAL ENCOUNTER (EMERGENCY)
Facility: HOSPITAL | Age: 49
Discharge: HOME | End: 2024-10-30
Attending: EMERGENCY MEDICINE
Payer: MEDICAID

## 2024-10-30 VITALS
WEIGHT: 222 LBS | HEIGHT: 68 IN | HEART RATE: 88 BPM | OXYGEN SATURATION: 100 % | RESPIRATION RATE: 16 BRPM | DIASTOLIC BLOOD PRESSURE: 91 MMHG | TEMPERATURE: 98.3 F | SYSTOLIC BLOOD PRESSURE: 144 MMHG | BODY MASS INDEX: 33.65 KG/M2

## 2024-10-30 DIAGNOSIS — R07.9 CHEST PAIN, UNSPECIFIED TYPE: Primary | ICD-10-CM

## 2024-10-30 LAB
ALBUMIN SERPL-MCNC: 4.2 G/DL (ref 3.5–5)
ALBUMIN/GLOB SERPL: 1.2 (ref 1.1–2.2)
ALP SERPL-CCNC: 112 U/L (ref 45–117)
ALT SERPL-CCNC: 39 U/L (ref 12–78)
ANION GAP SERPL CALC-SCNC: 14 MMOL/L (ref 2–12)
AST SERPL W P-5'-P-CCNC: 22 U/L (ref 15–37)
BASOPHILS # BLD: 0.1 K/UL (ref 0–0.1)
BASOPHILS NFR BLD: 1 % (ref 0–1)
BILIRUB SERPL-MCNC: 0.3 MG/DL (ref 0.2–1)
BUN SERPL-MCNC: 10 MG/DL (ref 6–20)
BUN/CREAT SERPL: 9 (ref 12–20)
CA-I BLD-MCNC: 10 MG/DL (ref 8.5–10.1)
CHLORIDE SERPL-SCNC: 99 MMOL/L (ref 97–108)
CO2 SERPL-SCNC: 29 MMOL/L (ref 21–32)
CREAT SERPL-MCNC: 1.06 MG/DL (ref 0.55–1.02)
DIFFERENTIAL METHOD BLD: ABNORMAL
EOSINOPHIL # BLD: 0 K/UL (ref 0–0.4)
EOSINOPHIL NFR BLD: 0 % (ref 0–7)
ERYTHROCYTE [DISTWIDTH] IN BLOOD BY AUTOMATED COUNT: 13 % (ref 11.5–14.5)
GLOBULIN SER CALC-MCNC: 3.6 G/DL (ref 2–4)
GLUCOSE SERPL-MCNC: 155 MG/DL (ref 65–100)
HCT VFR BLD AUTO: 44.8 % (ref 35–47)
HGB BLD-MCNC: 15.2 G/DL (ref 11.5–16)
IMM GRANULOCYTES # BLD AUTO: 0 K/UL (ref 0–0.04)
IMM GRANULOCYTES NFR BLD AUTO: 0 % (ref 0–0.5)
LYMPHOCYTES # BLD: 3.3 K/UL (ref 0.8–3.5)
LYMPHOCYTES NFR BLD: 26 % (ref 12–49)
MCH RBC QN AUTO: 28.6 PG (ref 26–34)
MCHC RBC AUTO-ENTMCNC: 33.9 G/DL (ref 30–36.5)
MCV RBC AUTO: 84.2 FL (ref 80–99)
MONOCYTES # BLD: 0.8 K/UL (ref 0–1)
MONOCYTES NFR BLD: 6 % (ref 5–13)
NEUTS SEG # BLD: 8.5 K/UL (ref 1.8–8)
NEUTS SEG NFR BLD: 67 % (ref 32–75)
PLATELET # BLD AUTO: 377 K/UL (ref 150–400)
PMV BLD AUTO: 10.8 FL (ref 8.9–12.9)
POTASSIUM SERPL-SCNC: 2.9 MMOL/L (ref 3.5–5.1)
PROT SERPL-MCNC: 7.8 G/DL (ref 6.4–8.2)
RBC # BLD AUTO: 5.32 M/UL (ref 3.8–5.2)
SODIUM SERPL-SCNC: 142 MMOL/L (ref 136–145)
TROPONIN I SERPL HS-MCNC: 6 NG/L (ref 0–51)
WBC # BLD AUTO: 12.7 K/UL (ref 3.6–11)

## 2024-10-30 PROCEDURE — 80053 COMPREHEN METABOLIC PANEL: CPT

## 2024-10-30 PROCEDURE — 36415 COLL VENOUS BLD VENIPUNCTURE: CPT

## 2024-10-30 PROCEDURE — 99285 EMERGENCY DEPT VISIT HI MDM: CPT

## 2024-10-30 PROCEDURE — 71275 CT ANGIOGRAPHY CHEST: CPT

## 2024-10-30 PROCEDURE — 6370000000 HC RX 637 (ALT 250 FOR IP): Performed by: EMERGENCY MEDICINE

## 2024-10-30 PROCEDURE — 85025 COMPLETE CBC W/AUTO DIFF WBC: CPT

## 2024-10-30 PROCEDURE — 71045 X-RAY EXAM CHEST 1 VIEW: CPT

## 2024-10-30 PROCEDURE — 93005 ELECTROCARDIOGRAM TRACING: CPT | Performed by: EMERGENCY MEDICINE

## 2024-10-30 PROCEDURE — 6360000004 HC RX CONTRAST MEDICATION: Performed by: EMERGENCY MEDICINE

## 2024-10-30 PROCEDURE — 84484 ASSAY OF TROPONIN QUANT: CPT

## 2024-10-30 RX ORDER — MAGNESIUM HYDROXIDE/ALUMINUM HYDROXICE/SIMETHICONE 120; 1200; 1200 MG/30ML; MG/30ML; MG/30ML
30 SUSPENSION ORAL
Status: COMPLETED | OUTPATIENT
Start: 2024-10-30 | End: 2024-10-30

## 2024-10-30 RX ORDER — ASPIRIN 325 MG
325 TABLET ORAL
Status: COMPLETED | OUTPATIENT
Start: 2024-10-30 | End: 2024-10-30

## 2024-10-30 RX ORDER — LIDOCAINE HYDROCHLORIDE 20 MG/ML
15 SOLUTION OROPHARYNGEAL EVERY 4 HOURS PRN
Qty: 250 ML | Refills: 1 | Status: SHIPPED | OUTPATIENT
Start: 2024-10-30

## 2024-10-30 RX ORDER — IOPAMIDOL 755 MG/ML
100 INJECTION, SOLUTION INTRAVASCULAR
Status: COMPLETED | OUTPATIENT
Start: 2024-10-30 | End: 2024-10-30

## 2024-10-30 RX ORDER — ONDANSETRON 4 MG/1
4 TABLET, ORALLY DISINTEGRATING ORAL EVERY 8 HOURS PRN
Qty: 20 TABLET | Refills: 0 | Status: SHIPPED | OUTPATIENT
Start: 2024-10-30

## 2024-10-30 RX ORDER — LIDOCAINE HYDROCHLORIDE 20 MG/ML
15 SOLUTION OROPHARYNGEAL
Status: COMPLETED | OUTPATIENT
Start: 2024-10-30 | End: 2024-10-30

## 2024-10-30 RX ADMIN — LIDOCAINE HYDROCHLORIDE 15 ML: 20 SOLUTION ORAL at 16:30

## 2024-10-30 RX ADMIN — IOPAMIDOL 100 ML: 755 INJECTION, SOLUTION INTRAVENOUS at 14:34

## 2024-10-30 RX ADMIN — ALUMINUM HYDROXIDE, MAGNESIUM HYDROXIDE, AND SIMETHICONE 30 ML: 200; 200; 20 SUSPENSION ORAL at 16:30

## 2024-10-30 RX ADMIN — ASPIRIN 325 MG: 325 TABLET ORAL at 13:34

## 2024-10-30 ASSESSMENT — PAIN - FUNCTIONAL ASSESSMENT: PAIN_FUNCTIONAL_ASSESSMENT: 0-10

## 2024-10-30 ASSESSMENT — PAIN SCALES - GENERAL
PAINLEVEL_OUTOF10: 10
PAINLEVEL_OUTOF10: 0

## 2024-10-30 ASSESSMENT — PAIN DESCRIPTION - LOCATION: LOCATION: CHEST

## 2024-10-30 ASSESSMENT — HEART SCORE: ECG: NON-SPECIFC REPOLARIZATION DISTURBANCE/LBTB/PM

## 2024-10-30 ASSESSMENT — PAIN DESCRIPTION - DESCRIPTORS: DESCRIPTORS: DISCOMFORT

## 2024-10-30 NOTE — ED TRIAGE NOTES
PT. TO ED WITH C/O CHEST PAIN AND SOB THAT STARTED YESTERDAY.  PT. CURRENTLY BEING SEEN BY CARDIOLOGIST FOR THE SAME SYMPTOMS,  LAST VISIT 2 WEEKS, AGO.  SOB ON EXERTION NOTED AT WORK TODAY.

## 2024-10-30 NOTE — DISCHARGE INSTRUCTIONS
Thank you for choosing our Emergency Department for your care.  It is our privilege to care for you in your time of need.  In the next several days, you may receive a survey via email or mailed to your home about your experience with our team.  We would greatly appreciate you taking a few minutes to complete the survey, as we use this information to learn what we have done well and what we could be doing better. Thank you for trusting us with your care!    Below you will find a list of your tests from today's visit.   Labs  Recent Results (from the past 12 hour(s))   CBC with Auto Differential    Collection Time: 10/30/24  1:15 PM   Result Value Ref Range    WBC 12.7 (H) 3.6 - 11.0 K/uL    RBC 5.32 (H) 3.80 - 5.20 M/uL    Hemoglobin 15.2 11.5 - 16.0 g/dL    Hematocrit 44.8 35.0 - 47.0 %    MCV 84.2 80.0 - 99.0 FL    MCH 28.6 26.0 - 34.0 PG    MCHC 33.9 30.0 - 36.5 g/dL    RDW 13.0 11.5 - 14.5 %    Platelets 377 150 - 400 K/uL    MPV 10.8 8.9 - 12.9 FL    Neutrophils % 67 32 - 75 %    Lymphocytes % 26 12 - 49 %    Monocytes % 6 5 - 13 %    Eosinophils % 0 0 - 7 %    Basophils % 1 0 - 1 %    Immature Granulocytes % 0 0.0 - 0.5 %    Neutrophils Absolute 8.5 (H) 1.8 - 8.0 K/UL    Lymphocytes Absolute 3.3 0.8 - 3.5 K/UL    Monocytes Absolute 0.8 0.0 - 1.0 K/UL    Eosinophils Absolute 0.0 0.0 - 0.4 K/UL    Basophils Absolute 0.1 0.0 - 0.1 K/UL    Immature Granulocytes Absolute 0.0 0.00 - 0.04 K/UL    Differential Type AUTOMATED     Troponin    Collection Time: 10/30/24  1:15 PM   Result Value Ref Range    Troponin, High Sensitivity 6 0 - 51 ng/L   Comprehensive Metabolic Panel    Collection Time: 10/30/24  1:15 PM   Result Value Ref Range    Sodium 142 136 - 145 mmol/L    Potassium 2.9 (L) 3.5 - 5.1 mmol/L    Chloride 99 97 - 108 mmol/L    CO2 29 21 - 32 mmol/L    Anion Gap 14 (H) 2 - 12 mmol/L    Glucose 155 (H) 65 - 100 mg/dL    BUN 10 6 - 20 mg/dL    Creatinine 1.06 (H) 0.55 - 1.02 mg/dL    BUN/Creatinine Ratio 9

## 2024-10-30 NOTE — ED PROVIDER NOTES
tablet  Commonly known as: TYLENOL     chlorhexidine 0.12 % solution  Commonly known as: PERIDEX     clotrimazole-betamethasone 1-0.05 % cream  Commonly known as: Lotrisone  Apply topically 2 times daily.     ibuprofen 600 MG tablet  Commonly known as: ADVIL;MOTRIN     oxyCODONE 5 MG immediate release tablet  Commonly known as: ROXICODONE     predniSONE 10 MG tablet  Commonly known as: DELTASONE  Take 4tab(40mg)x3days, 3tab(30mg)x3days, 2tab(20mg)x2days, 1tab(10mg)x2days. #27     * spironolactone 25 MG tablet  Commonly known as: ALDACTONE     * spironolactone 25 MG tablet  Commonly known as: ALDACTONE           * This list has 2 medication(s) that are the same as other medications prescribed for you. Read the directions carefully, and ask your doctor or other care provider to review them with you.                   Where to Get Your Medications        These medications were sent to [facility] #[id number] - [facility] [address] - P [phone] - F [phone] [address] [address]      Phone: [phone]   lidocaine viscous hcl 2 % Soln solution  omeprazole 20 MG delayed release capsule  ondansetron 4 MG disintegrating tablet           DISCONTINUED MEDICATIONS:  Current Discharge Medication List          I am the Primary Clinician of Record. Antwon Gary MD (electronically signed)    (Please note that parts of this dictation were completed with voice recognition software. Quite often unanticipated grammatical, syntax, homophones, and other interpretive errors are inadvertently transcribed by the computer software. Please disregards these errors. Please excuse any errors that have escaped final proofreading.)     Antwon Gary MD  10/30/24 [id number]

## 2024-11-01 LAB
EKG ATRIAL RATE: 114 BPM
EKG DIAGNOSIS: NORMAL
EKG P AXIS: 69 DEGREES
EKG P-R INTERVAL: 156 MS
EKG Q-T INTERVAL: 332 MS
EKG QRS DURATION: 84 MS
EKG QTC CALCULATION (BAZETT): 457 MS
EKG R AXIS: 68 DEGREES
EKG T AXIS: 48 DEGREES
EKG VENTRICULAR RATE: 114 BPM

## 2025-06-10 ENCOUNTER — HOSPITAL ENCOUNTER (EMERGENCY)
Facility: HOSPITAL | Age: 50
Discharge: HOME | End: 2025-06-10
Attending: EMERGENCY MEDICINE
Payer: MEDICAID

## 2025-06-10 VITALS
BODY MASS INDEX: 34.4 KG/M2 | HEART RATE: 85 BPM | OXYGEN SATURATION: 97 % | SYSTOLIC BLOOD PRESSURE: 161 MMHG | HEIGHT: 68 IN | WEIGHT: 227 LBS | TEMPERATURE: 98.3 F | DIASTOLIC BLOOD PRESSURE: 88 MMHG | RESPIRATION RATE: 16 BRPM

## 2025-06-10 DIAGNOSIS — L23.7 POISON IVY DERMATITIS: Primary | ICD-10-CM

## 2025-06-10 PROCEDURE — 6360000002 HC RX W HCPCS: Performed by: EMERGENCY MEDICINE

## 2025-06-10 PROCEDURE — 99284 EMERGENCY DEPT VISIT MOD MDM: CPT

## 2025-06-10 PROCEDURE — 96372 THER/PROPH/DIAG INJ SC/IM: CPT

## 2025-06-10 RX ORDER — NIFEDIPINE 90 MG/1
90 TABLET, FILM COATED, EXTENDED RELEASE ORAL EVERY MORNING
COMMUNITY

## 2025-06-10 RX ORDER — DEXAMETHASONE SODIUM PHOSPHATE 10 MG/ML
6 INJECTION, SOLUTION INTRAMUSCULAR; INTRAVENOUS ONCE
Status: DISCONTINUED | OUTPATIENT
Start: 2025-06-10 | End: 2025-06-10

## 2025-06-10 RX ORDER — DEXAMETHASONE SODIUM PHOSPHATE 10 MG/ML
6 INJECTION, SOLUTION INTRAMUSCULAR; INTRAVENOUS ONCE
Status: COMPLETED | OUTPATIENT
Start: 2025-06-10 | End: 2025-06-10

## 2025-06-10 RX ORDER — NIFEDIPINE 30 MG
30 TABLET, EXTENDED RELEASE ORAL DAILY
COMMUNITY

## 2025-06-10 RX ORDER — ATORVASTATIN CALCIUM 40 MG/1
40 TABLET, FILM COATED ORAL DAILY
COMMUNITY

## 2025-06-10 RX ORDER — HYDROCHLOROTHIAZIDE 25 MG/1
TABLET ORAL
COMMUNITY

## 2025-06-10 RX ORDER — METHYLPREDNISOLONE 4 MG/1
TABLET ORAL
Qty: 1 KIT | Refills: 0 | Status: SHIPPED | OUTPATIENT
Start: 2025-06-10 | End: 2025-06-16

## 2025-06-10 RX ADMIN — DEXAMETHASONE SODIUM PHOSPHATE 6 MG: 10 INJECTION, SOLUTION INTRAMUSCULAR; INTRAVENOUS at 23:09

## 2025-06-10 ASSESSMENT — PAIN - FUNCTIONAL ASSESSMENT: PAIN_FUNCTIONAL_ASSESSMENT: NONE - DENIES PAIN

## 2025-06-10 ASSESSMENT — LIFESTYLE VARIABLES
HOW OFTEN DO YOU HAVE A DRINK CONTAINING ALCOHOL: NEVER
HOW MANY STANDARD DRINKS CONTAINING ALCOHOL DO YOU HAVE ON A TYPICAL DAY: PATIENT DOES NOT DRINK

## 2025-06-11 NOTE — ED TRIAGE NOTES
States exposed to poison ivy doing yard work today. States \"I am highly allergic\" No meds taken PTA

## 2025-06-11 NOTE — ED PROVIDER NOTES
[facility] EMERGENCY DEPT  EMERGENCY DEPARTMENT HISTORY AND PHYSICAL EXAM      Date of evaluation: 6/10/2025  Patient Name: Mavis Olson  Birthdate 1975  MRN: [id number]  ED Provider: Gato Avery MD   Note Started: 11:07 PM EDT 6/10/25    HISTORY OF PRESENT ILLNESS     Chief Complaint   Patient presents with    Poison Ivy       History Provided By: Patient, only     HPI: Mavis Olson is a 50 y.o. female with known past medical history significant for being allergic to poison ivy presents with an outbreak of poison ivy.  Progressively worsening as the days gone on.    PAST MEDICAL HISTORY   Past Medical History:  Past Medical History:   Diagnosis Date    Hypertension, essential 2020    Impaired fasting glucose 2020    Nicotine dependence, other tobacco product, uncomplicated 2020    Obesity, unspecified 2020    Pure hypercholesterolemia 2020    pt states not on medication anymore       Past Surgical History:  Past Surgical History:   Procedure Laterality Date     SECTION  2011     SECTION  1993    TONSILLECTOMY      WISDOM TOOTH EXTRACTION         Family History:  Family History   Problem Relation Age of Onset    Cancer Father     Stroke Mother        Social History:  Social History     Tobacco Use    Smoking status: Every Day     Current packs/day: 0.25     Types: Cigarettes    Smokeless tobacco: Never    Tobacco comments:     Quit smoking: states 4 a day   Substance Use Topics    Alcohol use: Not Currently    Drug use: Never       Allergies:  Allergies   Allergen Reactions    Sulfa Antibiotics Swelling     Eyes    Amlodipine Swelling       PCP: Marciano Machado MD    Current Meds:   Current Facility-Administered Medications   Medication Dose Route Frequency Provider Last Rate Last Admin    dexAMETHasone (PF) (DECADRON) injection 6 mg  6 mg IntraMUSCular Once Gato Avery MD         Current Outpatient Medications   Medication Sig Dispense Refill

## (undated) DEVICE — SEAL INSTR PRT SELF SEAL

## (undated) DEVICE — MINOR VAGINAL PACK: Brand: MEDLINE INDUSTRIES, INC.

## (undated) DEVICE — TURNOVER KIT OR CUST CMB FLD GEN LF

## (undated) DEVICE — GAUZE,SPONGE,4"X4",16PLY,STRL,LF,10/TRAY: Brand: MEDLINE

## (undated) DEVICE — DRAPE,UNDERBUTTOCKS,PCH,STERILE: Brand: MEDLINE

## (undated) DEVICE — DEVICE TISS REMOVAL -- ORDER AS BX     APO CODE = DRP

## (undated) DEVICE — TOWEL,OR,DSP,ST,BLUE,STD,6/PK,12PK/CS: Brand: MEDLINE

## (undated) DEVICE — BASIC SINGLE BASIN-LF: Brand: MEDLINE INDUSTRIES, INC.

## (undated) DEVICE — TUBE ST FLD CTRL AQUILEX INFLO --

## (undated) DEVICE — TUBE ST FLD AQUILEX OUTFLO --